# Patient Record
Sex: FEMALE | Race: WHITE | NOT HISPANIC OR LATINO | Employment: FULL TIME | URBAN - METROPOLITAN AREA
[De-identification: names, ages, dates, MRNs, and addresses within clinical notes are randomized per-mention and may not be internally consistent; named-entity substitution may affect disease eponyms.]

---

## 2020-01-27 LAB — CRC RECOMMENDATION EXT: NORMAL

## 2020-11-05 LAB — BCS RECOMMENDATION EXT: NORMAL

## 2021-11-09 LAB — BCS RECOMMENDATION EXT: NORMAL

## 2023-01-09 LAB
CHOLESTEROL, TOTAL: 233
EGFR: 102 ML/MIN/1.73 M2
HBA1C MFR BLD: 5.8 % (ref 4–5.6)
HDLC SERPL-MCNC: 41 MG/DL
LDLC SERPL CALC-MCNC: 141 MG/DL
NONHDLC SERPL-MCNC: 192 MG/DL
TRIGL SERPL-MCNC: 257 MG/DL (ref 0–149)
TSH: 1.11 (ref 0.27–4.2)

## 2023-02-01 LAB — BCS RECOMMENDATION EXT: NORMAL

## 2023-07-20 ENCOUNTER — OFFICE VISIT (OUTPATIENT)
Dept: FAMILY MEDICINE | Facility: CLINIC | Age: 63
End: 2023-07-20
Payer: COMMERCIAL

## 2023-07-20 VITALS
BODY MASS INDEX: 41.14 KG/M2 | HEART RATE: 82 BPM | HEIGHT: 62 IN | DIASTOLIC BLOOD PRESSURE: 72 MMHG | WEIGHT: 223.56 LBS | TEMPERATURE: 98 F | SYSTOLIC BLOOD PRESSURE: 102 MMHG | OXYGEN SATURATION: 98 %

## 2023-07-20 DIAGNOSIS — Z90.49 S/P CHOLECYSTECTOMY: ICD-10-CM

## 2023-07-20 DIAGNOSIS — E78.5 HYPERLIPIDEMIA, UNSPECIFIED HYPERLIPIDEMIA TYPE: ICD-10-CM

## 2023-07-20 DIAGNOSIS — E66.01 CLASS 3 SEVERE OBESITY DUE TO EXCESS CALORIES WITH SERIOUS COMORBIDITY AND BODY MASS INDEX (BMI) OF 40.0 TO 44.9 IN ADULT: ICD-10-CM

## 2023-07-20 DIAGNOSIS — K21.9 GASTROESOPHAGEAL REFLUX DISEASE, UNSPECIFIED WHETHER ESOPHAGITIS PRESENT: ICD-10-CM

## 2023-07-20 DIAGNOSIS — R19.7 DIARRHEA DUE TO MALABSORPTION: ICD-10-CM

## 2023-07-20 DIAGNOSIS — Z00.00 ENCOUNTER FOR MEDICAL EXAMINATION TO ESTABLISH CARE: Primary | ICD-10-CM

## 2023-07-20 DIAGNOSIS — I10 ESSENTIAL HYPERTENSION: ICD-10-CM

## 2023-07-20 DIAGNOSIS — Z00.00 ANNUAL PHYSICAL EXAM: ICD-10-CM

## 2023-07-20 DIAGNOSIS — R73.03 PRE-DIABETES: ICD-10-CM

## 2023-07-20 DIAGNOSIS — K90.9 DIARRHEA DUE TO MALABSORPTION: ICD-10-CM

## 2023-07-20 DIAGNOSIS — Z23 NEED FOR SHINGLES VACCINE: ICD-10-CM

## 2023-07-20 DIAGNOSIS — Z85.42 HISTORY OF ENDOMETRIAL CANCER: ICD-10-CM

## 2023-07-20 PROCEDURE — 1159F MED LIST DOCD IN RCRD: CPT | Mod: CPTII,S$GLB,, | Performed by: FAMILY MEDICINE

## 2023-07-20 PROCEDURE — 3008F PR BODY MASS INDEX (BMI) DOCUMENTED: ICD-10-PCS | Mod: CPTII,S$GLB,, | Performed by: FAMILY MEDICINE

## 2023-07-20 PROCEDURE — 99386 PREV VISIT NEW AGE 40-64: CPT | Mod: 25,S$GLB,, | Performed by: FAMILY MEDICINE

## 2023-07-20 PROCEDURE — 90471 IMMUNIZATION ADMIN: CPT | Mod: S$GLB,,, | Performed by: FAMILY MEDICINE

## 2023-07-20 PROCEDURE — 99204 PR OFFICE/OUTPT VISIT, NEW, LEVL IV, 45-59 MIN: ICD-10-PCS | Mod: 25,S$GLB,, | Performed by: FAMILY MEDICINE

## 2023-07-20 PROCEDURE — 3074F PR MOST RECENT SYSTOLIC BLOOD PRESSURE < 130 MM HG: ICD-10-PCS | Mod: CPTII,S$GLB,, | Performed by: FAMILY MEDICINE

## 2023-07-20 PROCEDURE — 99386 PR PREVENTIVE VISIT,NEW,40-64: ICD-10-PCS | Mod: 25,S$GLB,, | Performed by: FAMILY MEDICINE

## 2023-07-20 PROCEDURE — 3078F PR MOST RECENT DIASTOLIC BLOOD PRESSURE < 80 MM HG: ICD-10-PCS | Mod: CPTII,S$GLB,, | Performed by: FAMILY MEDICINE

## 2023-07-20 PROCEDURE — 90471 ZOSTER RECOMBINANT VACCINE: ICD-10-PCS | Mod: S$GLB,,, | Performed by: FAMILY MEDICINE

## 2023-07-20 PROCEDURE — 4010F ACE/ARB THERAPY RXD/TAKEN: CPT | Mod: CPTII,S$GLB,, | Performed by: FAMILY MEDICINE

## 2023-07-20 PROCEDURE — 4010F PR ACE/ARB THEARPY RXD/TAKEN: ICD-10-PCS | Mod: CPTII,S$GLB,, | Performed by: FAMILY MEDICINE

## 2023-07-20 PROCEDURE — 1160F RVW MEDS BY RX/DR IN RCRD: CPT | Mod: CPTII,S$GLB,, | Performed by: FAMILY MEDICINE

## 2023-07-20 PROCEDURE — 3074F SYST BP LT 130 MM HG: CPT | Mod: CPTII,S$GLB,, | Performed by: FAMILY MEDICINE

## 2023-07-20 PROCEDURE — 3078F DIAST BP <80 MM HG: CPT | Mod: CPTII,S$GLB,, | Performed by: FAMILY MEDICINE

## 2023-07-20 PROCEDURE — 90750 HZV VACC RECOMBINANT IM: CPT | Mod: S$GLB,,, | Performed by: FAMILY MEDICINE

## 2023-07-20 PROCEDURE — 3008F BODY MASS INDEX DOCD: CPT | Mod: CPTII,S$GLB,, | Performed by: FAMILY MEDICINE

## 2023-07-20 PROCEDURE — 1159F PR MEDICATION LIST DOCUMENTED IN MEDICAL RECORD: ICD-10-PCS | Mod: CPTII,S$GLB,, | Performed by: FAMILY MEDICINE

## 2023-07-20 PROCEDURE — 99204 OFFICE O/P NEW MOD 45 MIN: CPT | Mod: 25,S$GLB,, | Performed by: FAMILY MEDICINE

## 2023-07-20 PROCEDURE — 1160F PR REVIEW ALL MEDS BY PRESCRIBER/CLIN PHARMACIST DOCUMENTED: ICD-10-PCS | Mod: CPTII,S$GLB,, | Performed by: FAMILY MEDICINE

## 2023-07-20 PROCEDURE — 99999 PR PBB SHADOW E&M-NEW PATIENT-LVL V: ICD-10-PCS | Mod: PBBFAC,,, | Performed by: FAMILY MEDICINE

## 2023-07-20 PROCEDURE — 90750 ZOSTER RECOMBINANT VACCINE: ICD-10-PCS | Mod: S$GLB,,, | Performed by: FAMILY MEDICINE

## 2023-07-20 PROCEDURE — 99999 PR PBB SHADOW E&M-NEW PATIENT-LVL V: CPT | Mod: PBBFAC,,, | Performed by: FAMILY MEDICINE

## 2023-07-20 RX ORDER — LISINOPRIL AND HYDROCHLOROTHIAZIDE 12.5; 2 MG/1; MG/1
1 TABLET ORAL
COMMUNITY
Start: 2023-07-15 | End: 2023-09-12 | Stop reason: SDUPTHER

## 2023-07-20 RX ORDER — OMEPRAZOLE 20 MG/1
20 CAPSULE, DELAYED RELEASE ORAL
COMMUNITY
Start: 2023-05-07

## 2023-07-20 NOTE — PROGRESS NOTES
Assessment & Plan:    Encounter for medical examination to establish care    Annual physical exam  -     CBC Auto Differential; Future; Expected date: 07/20/2023  -     Comprehensive Metabolic Panel; Future; Expected date: 07/20/2023  -     Hemoglobin A1C; Future; Expected date: 07/20/2023  -     Lipid Panel; Future; Expected date: 07/20/2023  -     HIV 1/2 Ag/Ab (4th Gen); Future; Expected date: 07/20/2023  -     Hepatitis C Antibody; Future; Expected date: 07/20/2023    Fasting labs to be scheduled.    Essential hypertension  -     CBC Auto Differential; Future; Expected date: 07/20/2023  -     Comprehensive Metabolic Panel; Future; Expected date: 07/20/2023  -     Hemoglobin A1C; Future; Expected date: 07/20/2023  -     Lipid Panel; Future; Expected date: 07/20/2023    Controlled. Continue current therapy.     Pre-diabetes  -     Hemoglobin A1C; Future; Expected date: 07/20/2023    Hyperlipidemia, unspecified hyperlipidemia type  -     Lipid Panel; Future; Expected date: 07/20/2023    Class 3 severe obesity due to excess calories with serious comorbidity and body mass index (BMI) of 40.0 to 44.9 in adult  -     Hemoglobin A1C; Future; Expected date: 07/20/2023  -     Lipid Panel; Future; Expected date: 07/20/2023  -     Ambulatory referral/consult to Bariatric Surgery; Future; Expected date: 07/27/2023    Commended patient on getting back on her exercise routine.     History of endometrial cancer  -     Ambulatory referral/consult to Obstetrics / Gynecology; Future; Expected date: 07/27/2023    S/P cholecystectomy  Diarrhea due to malabsorption  Discussed benefit from [employer]. Discussed low fat diet - handout provided.    Gastroesophageal reflux disease, unspecified whether esophagitis present  Controlled. Continue current therapy.     Need for shingles vaccine  -     (In Office Administered) Zoster Recombinant Vaccine      Health maintenance reviewed:  -Patient states that she is UTD on colonoscopy (done 2021 -  repeat recommended in 5 years) and mammogram (normal in December) - will obtain records.    Follow-up: Follow up in about 2 months (around 9/20/2023) for nurse visit for 2nd shingles vaccine, 6 months for chronic follow up.  ______________________________________________________________________    Chief Complaint  Chief Complaint   Patient presents with    Establish Care       HPI  Violetta Coe is a 62 y.o. female with medical diagnoses as listed in the medical history and problem list that presents to the office to establish care. She recently moved to [place] with her partner from [place]. She would like to discuss the following:    Concerned about low potassium from lisinopril-HCTZ. She used to take HCTZ only in the past and had to take a K supplement.  Reports history of endometrial cancer s/p total hysterectomy - requesting referral to gynecology for routine pelvic exams.  Diarrhea - Chronic, began after cholecystectomy. Has gotten worse over COVID. She is prescribed colestipol but hesitant to take medication for it.   Interested in bariatric surgery. She states that she has been overweight all her life. She used to exercise on a regular basis by rowing 6 meters a day but became more inactive when she lost her mother and started working from home during the COVID pandemic. She has started rowing again and is working back up to her personal record. Reports history of pre-DM and HLD.     Health Maintenance         Date Due Completion Date    Hepatitis C Screening Never done ---    Lipid Panel Never done ---    COVID-19 Vaccine (1) Never done ---    HIV Screening Never done ---    TETANUS VACCINE Never done ---    Mammogram Never done ---    Hemoglobin A1c (Diabetic Prevention Screening) Never done ---    Colorectal Cancer Screening Never done ---    Influenza Vaccine (1) 09/01/2023 ---    Shingles Vaccine (2 of 2) 09/14/2023 7/20/2023              PAST MEDICAL HISTORY:  History reviewed. No pertinent past medical  history.    PAST SURGICAL HISTORY:  Past Surgical History:   Procedure Laterality Date    BREAST LUMPECTOMY      CATARACT EXTRACTION      CHOLECYSTECTOMY      GALLBLADDER SURGERY      HYSTERECTOMY         SOCIAL HISTORY:  Social History     Socioeconomic History    Marital status: Unknown   Tobacco Use    Smoking status: Never    Smokeless tobacco: Never   Substance and Sexual Activity    Alcohol use: Never    Drug use: Never       FAMILY HISTORY:  Family History   Problem Relation Age of Onset    Hypertension Mother     Diabetes Mother     Stroke Mother     Breast cancer Mother     Hypertension Father     Hypothyroidism Father     Prostate cancer Father     Hypertension Brother        ALLERGIES AND MEDICATIONS: updated and reviewed.  Review of patient's allergies indicates:  No Known Allergies  Current Outpatient Medications   Medication Sig Dispense Refill    lisinopriL-hydrochlorothiazide (PRINZIDE,ZESTORETIC) 20-12.5 mg per tablet Take 1 tablet by mouth.      omeprazole (PRILOSEC) 20 MG capsule Take 20 mg by mouth.       No current facility-administered medications for this visit.         ROS  Review of Systems   Constitutional:  Positive for activity change and unexpected weight change.   HENT:  Negative for congestion and sore throat.    Eyes:  Negative for photophobia and visual disturbance.   Respiratory:  Negative for cough and shortness of breath.    Cardiovascular:  Negative for chest pain and leg swelling.   Gastrointestinal:  Positive for diarrhea. Negative for abdominal pain, constipation, nausea and vomiting.   Endocrine: Negative for polydipsia, polyphagia and polyuria.   Genitourinary:  Negative for dysuria and urgency.   Musculoskeletal:  Negative for arthralgias and gait problem.   Skin:  Negative for color change.   Neurological:  Negative for dizziness, weakness and headaches.   Psychiatric/Behavioral:  Negative for dysphoric mood and sleep disturbance. The patient is not nervous/anxious.   "        Physical Exam  Vitals:    07/20/23 1715   BP: 102/72   BP Location: Right arm   Patient Position: Sitting   BP Method: Large (Manual)   Pulse: 82   Temp: 98.2 °F (36.8 °C)   TempSrc: Oral   SpO2: 98%   Weight: 101.4 kg (223 lb 8.7 oz)   Height: 5' 2" (1.575 m)    Body mass index is 40.89 kg/m².  Weight: 101.4 kg (223 lb 8.7 oz)   Height: 5' 2" (157.5 cm)   Physical Exam  Constitutional:       General: She is not in acute distress.     Appearance: She is obese.   HENT:      Head: Normocephalic and atraumatic.   Neck:      Thyroid: No thyromegaly.      Vascular: No carotid bruit.   Cardiovascular:      Rate and Rhythm: Normal rate and regular rhythm.      Pulses: Normal pulses.      Heart sounds: Normal heart sounds.   Pulmonary:      Effort: Pulmonary effort is normal. No respiratory distress.      Breath sounds: Normal breath sounds.   Musculoskeletal:      Cervical back: Neck supple.      Right lower leg: No edema.      Left lower leg: No edema.   Lymphadenopathy:      Cervical: No cervical adenopathy.   Skin:     General: Skin is warm and dry.      Findings: No rash.   Neurological:      General: No focal deficit present.      Mental Status: She is alert and oriented to person, place, and time.   Psychiatric:         Mood and Affect: Mood normal.         Behavior: Behavior normal.         Thought Content: Thought content normal.           "

## 2023-07-21 ENCOUNTER — TELEPHONE (OUTPATIENT)
Dept: BARIATRICS | Facility: CLINIC | Age: 63
End: 2023-07-21
Payer: COMMERCIAL

## 2023-07-25 ENCOUNTER — PATIENT OUTREACH (OUTPATIENT)
Dept: ADMINISTRATIVE | Facility: HOSPITAL | Age: 63
End: 2023-07-25
Payer: COMMERCIAL

## 2023-07-25 RX ORDER — PREDNISOLONE ACETATE 10 MG/ML
SUSPENSION/ DROPS OPHTHALMIC
COMMUNITY
Start: 2022-12-20 | End: 2023-11-10

## 2023-07-25 RX ORDER — COVID-19 ANTIGEN TEST
KIT MISCELLANEOUS
COMMUNITY
Start: 2023-03-04

## 2023-07-25 RX ORDER — BROMFENAC SODIUM 0.7 MG/ML
SOLUTION/ DROPS OPHTHALMIC
COMMUNITY
Start: 2022-12-20 | End: 2023-11-10

## 2023-07-25 RX ORDER — OMEPRAZOLE 20 MG/1
20 TABLET, DELAYED RELEASE ORAL
COMMUNITY
End: 2023-11-10

## 2023-07-25 RX ORDER — LISINOPRIL AND HYDROCHLOROTHIAZIDE 12.5; 2 MG/1; MG/1
1 TABLET ORAL DAILY
COMMUNITY
Start: 2023-07-14 | End: 2023-09-12

## 2023-07-25 RX ORDER — MOXIFLOXACIN 5 MG/ML
SOLUTION/ DROPS OPHTHALMIC
COMMUNITY
Start: 2022-12-20 | End: 2023-11-10

## 2023-07-25 RX ORDER — LISINOPRIL 10 MG/1
10 TABLET ORAL 2 TIMES DAILY
COMMUNITY
Start: 2023-02-03 | End: 2023-09-12

## 2023-07-25 RX ORDER — ASPIRIN 325 MG
1 TABLET, DELAYED RELEASE (ENTERIC COATED) ORAL
COMMUNITY

## 2023-07-25 RX ORDER — OMEPRAZOLE 20 MG/1
20 CAPSULE, DELAYED RELEASE ORAL
COMMUNITY
Start: 2023-02-08 | End: 2023-11-10

## 2023-07-25 RX ORDER — NYSTATIN 100000 [USP'U]/G
POWDER TOPICAL
COMMUNITY
Start: 2022-11-18

## 2023-07-25 NOTE — PROGRESS NOTES
Overdue Mammogram - A request was sent today for patient's record. I spoke w/ Tracey ferguson/ [facility], she will have the patient's transcribed results and images on CD sent to our office.    Overdue Colonoscopy - Health Maintenance updated.    Labs updated.

## 2023-07-25 NOTE — LETTER
AUTHORIZATION FOR RELEASE OF   CONFIDENTIAL INFORMATION    Dear [facility],    We are seeing Violetta Coe, date of birth 1960, in the clinic at [facility] FAMILY MED/ INTERNAL MED/ PEDS. Berta Evans DO is the patient's PCP. Violetta Coe has an outstanding lab/procedure at the time we reviewed her chart. In order to help keep her health information updated, she has authorized us to request the following medical record(s):        ( x ) MAMMOGRAM [id number]                   (  )  COLONOSCOPY      (  )  PAP SMEAR                                          (  )  OUTSIDE LAB RESULTS     (  )  DEXA SCAN                                          (  )  EYE EXAM            (  )  FOOT EXAM                                          (  )  ENTIRE RECORD     (  )  OUTSIDE IMMUNIZATIONS                 (  )  _______________         Please fax records to [facility], Lyndsey R Adams, DO,  [phone].     If you have any questions, please contact Vandana Montemayor LPN [facility] at   [phone].     Patient Name: Violetta Coe  : 1960  Patient Phone #: [phone]     ATTN: Tracey  [facility] Address: [address] [place], [place] [address] [address]

## 2023-07-26 DIAGNOSIS — Z12.31 OTHER SCREENING MAMMOGRAM: ICD-10-CM

## 2023-07-27 ENCOUNTER — PATIENT OUTREACH (OUTPATIENT)
Dept: ADMINISTRATIVE | Facility: HOSPITAL | Age: 63
End: 2023-07-27
Payer: COMMERCIAL

## 2023-08-01 ENCOUNTER — TELEPHONE (OUTPATIENT)
Dept: SURGERY | Facility: CLINIC | Age: 63
End: 2023-08-01
Payer: COMMERCIAL

## 2023-08-04 ENCOUNTER — LAB VISIT (OUTPATIENT)
Dept: LAB | Facility: HOSPITAL | Age: 63
End: 2023-08-04
Attending: FAMILY MEDICINE
Payer: COMMERCIAL

## 2023-08-04 DIAGNOSIS — Z00.00 ANNUAL PHYSICAL EXAM: ICD-10-CM

## 2023-08-04 DIAGNOSIS — I10 ESSENTIAL HYPERTENSION: ICD-10-CM

## 2023-08-04 DIAGNOSIS — E78.5 HYPERLIPIDEMIA, UNSPECIFIED HYPERLIPIDEMIA TYPE: ICD-10-CM

## 2023-08-04 DIAGNOSIS — E66.01 CLASS 3 SEVERE OBESITY DUE TO EXCESS CALORIES WITH SERIOUS COMORBIDITY AND BODY MASS INDEX (BMI) OF 40.0 TO 44.9 IN ADULT: ICD-10-CM

## 2023-08-04 DIAGNOSIS — R73.03 PRE-DIABETES: ICD-10-CM

## 2023-08-04 LAB
ALBUMIN SERPL BCP-MCNC: 3.6 G/DL (ref 3.5–5.2)
ALP SERPL-CCNC: 79 U/L (ref 55–135)
ALT SERPL W/O P-5'-P-CCNC: 25 U/L (ref 10–44)
ANION GAP SERPL CALC-SCNC: 10 MMOL/L (ref 8–16)
AST SERPL-CCNC: 19 U/L (ref 10–40)
BASOPHILS # BLD AUTO: 0.03 K/UL (ref 0–0.2)
BASOPHILS NFR BLD: 0.5 % (ref 0–1.9)
BILIRUB SERPL-MCNC: 0.3 MG/DL (ref 0.1–1)
BUN SERPL-MCNC: 15 MG/DL (ref 8–23)
CALCIUM SERPL-MCNC: 9.2 MG/DL (ref 8.7–10.5)
CHLORIDE SERPL-SCNC: 105 MMOL/L (ref 95–110)
CHOLEST SERPL-MCNC: 207 MG/DL (ref 120–199)
CHOLEST/HDLC SERPL: 6.5 {RATIO} (ref 2–5)
CO2 SERPL-SCNC: 25 MMOL/L (ref 23–29)
CREAT SERPL-MCNC: 0.6 MG/DL (ref 0.5–1.4)
DIFFERENTIAL METHOD: ABNORMAL
EOSINOPHIL # BLD AUTO: 0.1 K/UL (ref 0–0.5)
EOSINOPHIL NFR BLD: 1.6 % (ref 0–8)
ERYTHROCYTE [DISTWIDTH] IN BLOOD BY AUTOMATED COUNT: 12.9 % (ref 11.5–14.5)
EST. GFR  (NO RACE VARIABLE): >60 ML/MIN/1.73 M^2
ESTIMATED AVG GLUCOSE: 114 MG/DL (ref 68–131)
GLUCOSE SERPL-MCNC: 111 MG/DL (ref 70–110)
HBA1C MFR BLD: 5.6 % (ref 4–5.6)
HCT VFR BLD AUTO: 42 % (ref 37–48.5)
HCV AB SERPL QL IA: NORMAL
HDLC SERPL-MCNC: 32 MG/DL (ref 40–75)
HDLC SERPL: 15.5 % (ref 20–50)
HGB BLD-MCNC: 13.4 G/DL (ref 12–16)
HIV 1+2 AB+HIV1 P24 AG SERPL QL IA: NORMAL
IMM GRANULOCYTES # BLD AUTO: 0.04 K/UL (ref 0–0.04)
IMM GRANULOCYTES NFR BLD AUTO: 0.6 % (ref 0–0.5)
LDLC SERPL CALC-MCNC: 113.8 MG/DL (ref 63–159)
LYMPHOCYTES # BLD AUTO: 2.3 K/UL (ref 1–4.8)
LYMPHOCYTES NFR BLD: 36.4 % (ref 18–48)
MCH RBC QN AUTO: 27.7 PG (ref 27–31)
MCHC RBC AUTO-ENTMCNC: 31.9 G/DL (ref 32–36)
MCV RBC AUTO: 87 FL (ref 82–98)
MONOCYTES # BLD AUTO: 0.5 K/UL (ref 0.3–1)
MONOCYTES NFR BLD: 8.1 % (ref 4–15)
NEUTROPHILS # BLD AUTO: 3.3 K/UL (ref 1.8–7.7)
NEUTROPHILS NFR BLD: 52.8 % (ref 38–73)
NONHDLC SERPL-MCNC: 175 MG/DL
NRBC BLD-RTO: 0 /100 WBC
PLATELET # BLD AUTO: 293 K/UL (ref 150–450)
PMV BLD AUTO: 10.3 FL (ref 9.2–12.9)
POTASSIUM SERPL-SCNC: 4.2 MMOL/L (ref 3.5–5.1)
PROT SERPL-MCNC: 6.8 G/DL (ref 6–8.4)
RBC # BLD AUTO: 4.84 M/UL (ref 4–5.4)
SODIUM SERPL-SCNC: 140 MMOL/L (ref 136–145)
TRIGL SERPL-MCNC: 306 MG/DL (ref 30–150)
WBC # BLD AUTO: 6.29 K/UL (ref 3.9–12.7)

## 2023-08-04 PROCEDURE — 80061 LIPID PANEL: CPT | Performed by: FAMILY MEDICINE

## 2023-08-04 PROCEDURE — 87389 HIV-1 AG W/HIV-1&-2 AB AG IA: CPT | Performed by: FAMILY MEDICINE

## 2023-08-04 PROCEDURE — 85025 COMPLETE CBC W/AUTO DIFF WBC: CPT | Performed by: FAMILY MEDICINE

## 2023-08-04 PROCEDURE — 86803 HEPATITIS C AB TEST: CPT | Performed by: FAMILY MEDICINE

## 2023-08-04 PROCEDURE — 83036 HEMOGLOBIN GLYCOSYLATED A1C: CPT | Performed by: FAMILY MEDICINE

## 2023-08-04 PROCEDURE — 36415 COLL VENOUS BLD VENIPUNCTURE: CPT | Mod: PO | Performed by: FAMILY MEDICINE

## 2023-08-04 PROCEDURE — 80053 COMPREHEN METABOLIC PANEL: CPT | Performed by: FAMILY MEDICINE

## 2023-08-07 DIAGNOSIS — Z87.898 HISTORY OF PREDIABETES: ICD-10-CM

## 2023-08-07 DIAGNOSIS — I10 ESSENTIAL HYPERTENSION: Primary | ICD-10-CM

## 2023-08-07 DIAGNOSIS — E78.5 HYPERLIPIDEMIA, UNSPECIFIED HYPERLIPIDEMIA TYPE: ICD-10-CM

## 2023-08-07 NOTE — PROGRESS NOTES
Patient was informed of result(s) via [employer].  Please schedule fasting labs before f/u in 6 mo.

## 2023-08-08 ENCOUNTER — TELEPHONE (OUTPATIENT)
Dept: FAMILY MEDICINE | Facility: CLINIC | Age: 63
End: 2023-08-08

## 2023-08-08 NOTE — TELEPHONE ENCOUNTER
----- Message from Berta Evans DO sent at 8/7/2023 10:12 AM CDT -----  Regarding: Labs  Patient was informed of result(s) via Page[employer]ak[employer].  Please schedule fasting labs before f/u in 6 mo.

## 2023-08-09 ENCOUNTER — TELEPHONE (OUTPATIENT)
Dept: BARIATRICS | Facility: CLINIC | Age: 63
End: 2023-08-09
Payer: COMMERCIAL

## 2023-08-09 NOTE — TELEPHONE ENCOUNTER
Phoned patient and she was not able to talk at the present.  She is very tied down with work meetings and she is going out of town.  Asked to be called back next Wednesday at 11 AM. Informed her that we received a referral from her PCP and that is why we were calling her to review benefits and schedule consult appointments.  She just said it is not a good time.  Will defer referral until next Wednesday.

## 2023-09-12 RX ORDER — LISINOPRIL AND HYDROCHLOROTHIAZIDE 12.5; 2 MG/1; MG/1
1 TABLET ORAL DAILY
Qty: 90 TABLET | Refills: 3 | Status: SHIPPED | OUTPATIENT
Start: 2023-09-12

## 2023-09-12 NOTE — TELEPHONE ENCOUNTER
No care due was identified.  Health [facility] Embedded Care Due Messages. Reference number: [id number].   9/12/2023 4:32:52 PM CDT

## 2023-09-18 ENCOUNTER — TELEPHONE (OUTPATIENT)
Dept: BARIATRICS | Facility: CLINIC | Age: 63
End: 2023-09-18
Payer: COMMERCIAL

## 2023-09-18 ENCOUNTER — PATIENT MESSAGE (OUTPATIENT)
Dept: FAMILY MEDICINE | Facility: CLINIC | Age: 63
End: 2023-09-18
Payer: COMMERCIAL

## 2023-09-22 ENCOUNTER — CLINICAL SUPPORT (OUTPATIENT)
Dept: FAMILY MEDICINE | Facility: CLINIC | Age: 63
End: 2023-09-22
Payer: COMMERCIAL

## 2023-09-22 DIAGNOSIS — Z23 NEED FOR SHINGLES VACCINE: Primary | ICD-10-CM

## 2023-09-22 PROCEDURE — 90471 ZOSTER RECOMBINANT VACCINE: ICD-10-PCS | Mod: S$GLB,,, | Performed by: FAMILY MEDICINE

## 2023-09-22 PROCEDURE — 90750 ZOSTER RECOMBINANT VACCINE: ICD-10-PCS | Mod: S$GLB,,, | Performed by: FAMILY MEDICINE

## 2023-09-22 PROCEDURE — 90750 HZV VACC RECOMBINANT IM: CPT | Mod: S$GLB,,, | Performed by: FAMILY MEDICINE

## 2023-09-22 PROCEDURE — 90471 IMMUNIZATION ADMIN: CPT | Mod: S$GLB,,, | Performed by: FAMILY MEDICINE

## 2023-09-22 PROCEDURE — 99999 PR PBB SHADOW E&M-EST. PATIENT-LVL I: CPT | Mod: PBBFAC,,,

## 2023-09-22 PROCEDURE — 99999 PR PBB SHADOW E&M-EST. PATIENT-LVL I: ICD-10-PCS | Mod: PBBFAC,,,

## 2023-09-24 ENCOUNTER — PATIENT MESSAGE (OUTPATIENT)
Dept: FAMILY MEDICINE | Facility: CLINIC | Age: 63
End: 2023-09-24
Payer: COMMERCIAL

## 2023-10-06 ENCOUNTER — TELEPHONE (OUTPATIENT)
Dept: BARIATRICS | Facility: CLINIC | Age: 63
End: 2023-10-06
Payer: COMMERCIAL

## 2023-10-08 ENCOUNTER — PATIENT MESSAGE (OUTPATIENT)
Dept: FAMILY MEDICINE | Facility: CLINIC | Age: 63
End: 2023-10-08
Payer: COMMERCIAL

## 2023-10-09 NOTE — TELEPHONE ENCOUNTER
Pt contacted and instructed that her pt portal is to be used for her personal health care and should not be used for other patients. Pt informed that messages concerning her father need to be sent through his pt portal or by calling pt access. Pt verbalized understanding.

## 2023-11-10 ENCOUNTER — TELEPHONE (OUTPATIENT)
Dept: BARIATRICS | Facility: CLINIC | Age: 63
End: 2023-11-10
Payer: COMMERCIAL

## 2023-11-10 ENCOUNTER — OFFICE VISIT (OUTPATIENT)
Dept: BARIATRICS | Facility: CLINIC | Age: 63
End: 2023-11-10
Payer: COMMERCIAL

## 2023-11-10 ENCOUNTER — CLINICAL SUPPORT (OUTPATIENT)
Dept: BARIATRICS | Facility: CLINIC | Age: 63
End: 2023-11-10
Payer: COMMERCIAL

## 2023-11-10 VITALS
SYSTOLIC BLOOD PRESSURE: 110 MMHG | BODY MASS INDEX: 40.89 KG/M2 | HEART RATE: 90 BPM | WEIGHT: 223.56 LBS | OXYGEN SATURATION: 99 % | DIASTOLIC BLOOD PRESSURE: 78 MMHG

## 2023-11-10 DIAGNOSIS — Z71.3 DIETARY COUNSELING AND SURVEILLANCE: ICD-10-CM

## 2023-11-10 DIAGNOSIS — K21.9 GASTROESOPHAGEAL REFLUX DISEASE, UNSPECIFIED WHETHER ESOPHAGITIS PRESENT: ICD-10-CM

## 2023-11-10 DIAGNOSIS — G47.33 OSA ON CPAP: Primary | ICD-10-CM

## 2023-11-10 DIAGNOSIS — I10 HYPERTENSION, UNSPECIFIED TYPE: Primary | ICD-10-CM

## 2023-11-10 DIAGNOSIS — G47.33 OSA ON CPAP: ICD-10-CM

## 2023-11-10 DIAGNOSIS — E66.01 CLASS 3 SEVERE OBESITY DUE TO EXCESS CALORIES WITH SERIOUS COMORBIDITY AND BODY MASS INDEX (BMI) OF 40.0 TO 44.9 IN ADULT: ICD-10-CM

## 2023-11-10 DIAGNOSIS — I10 HYPERTENSION, UNSPECIFIED TYPE: ICD-10-CM

## 2023-11-10 DIAGNOSIS — E66.01 MORBID OBESITY WITH BMI OF 40.0-44.9, ADULT: ICD-10-CM

## 2023-11-10 DIAGNOSIS — E78.5 HYPERLIPIDEMIA, UNSPECIFIED HYPERLIPIDEMIA TYPE: ICD-10-CM

## 2023-11-10 PROCEDURE — 3008F PR BODY MASS INDEX (BMI) DOCUMENTED: ICD-10-PCS | Mod: CPTII,S$GLB,, | Performed by: NURSE PRACTITIONER

## 2023-11-10 PROCEDURE — 99999 PR PBB SHADOW E&M-EST. PATIENT-LVL V: ICD-10-PCS | Mod: PBBFAC,,, | Performed by: NURSE PRACTITIONER

## 2023-11-10 PROCEDURE — 1160F RVW MEDS BY RX/DR IN RCRD: CPT | Mod: CPTII,S$GLB,, | Performed by: NURSE PRACTITIONER

## 2023-11-10 PROCEDURE — 1159F MED LIST DOCD IN RCRD: CPT | Mod: CPTII,S$GLB,, | Performed by: NURSE PRACTITIONER

## 2023-11-10 PROCEDURE — 3044F PR MOST RECENT HEMOGLOBIN A1C LEVEL <7.0%: ICD-10-PCS | Mod: CPTII,S$GLB,, | Performed by: NURSE PRACTITIONER

## 2023-11-10 PROCEDURE — 3078F PR MOST RECENT DIASTOLIC BLOOD PRESSURE < 80 MM HG: ICD-10-PCS | Mod: CPTII,S$GLB,, | Performed by: NURSE PRACTITIONER

## 2023-11-10 PROCEDURE — 3074F SYST BP LT 130 MM HG: CPT | Mod: CPTII,S$GLB,, | Performed by: NURSE PRACTITIONER

## 2023-11-10 PROCEDURE — 99999 PR PBB SHADOW E&M-EST. PATIENT-LVL I: ICD-10-PCS | Mod: PBBFAC,,, | Performed by: DIETITIAN, REGISTERED

## 2023-11-10 PROCEDURE — 3044F HG A1C LEVEL LT 7.0%: CPT | Mod: CPTII,S$GLB,, | Performed by: NURSE PRACTITIONER

## 2023-11-10 PROCEDURE — 1159F PR MEDICATION LIST DOCUMENTED IN MEDICAL RECORD: ICD-10-PCS | Mod: CPTII,S$GLB,, | Performed by: NURSE PRACTITIONER

## 2023-11-10 PROCEDURE — 4010F ACE/ARB THERAPY RXD/TAKEN: CPT | Mod: CPTII,S$GLB,, | Performed by: NURSE PRACTITIONER

## 2023-11-10 PROCEDURE — 99999 PR PBB SHADOW E&M-EST. PATIENT-LVL I: CPT | Mod: PBBFAC,,, | Performed by: DIETITIAN, REGISTERED

## 2023-11-10 PROCEDURE — 1160F PR REVIEW ALL MEDS BY PRESCRIBER/CLIN PHARMACIST DOCUMENTED: ICD-10-PCS | Mod: CPTII,S$GLB,, | Performed by: NURSE PRACTITIONER

## 2023-11-10 PROCEDURE — 3074F PR MOST RECENT SYSTOLIC BLOOD PRESSURE < 130 MM HG: ICD-10-PCS | Mod: CPTII,S$GLB,, | Performed by: NURSE PRACTITIONER

## 2023-11-10 PROCEDURE — 99999 PR PBB SHADOW E&M-EST. PATIENT-LVL V: CPT | Mod: PBBFAC,,, | Performed by: NURSE PRACTITIONER

## 2023-11-10 PROCEDURE — 99499 UNLISTED E&M SERVICE: CPT | Mod: S$GLB,,, | Performed by: DIETITIAN, REGISTERED

## 2023-11-10 PROCEDURE — 3078F DIAST BP <80 MM HG: CPT | Mod: CPTII,S$GLB,, | Performed by: NURSE PRACTITIONER

## 2023-11-10 PROCEDURE — 99499 NO LOS: ICD-10-PCS | Mod: S$GLB,,, | Performed by: DIETITIAN, REGISTERED

## 2023-11-10 PROCEDURE — 3008F BODY MASS INDEX DOCD: CPT | Mod: CPTII,S$GLB,, | Performed by: NURSE PRACTITIONER

## 2023-11-10 PROCEDURE — 99205 PR OFFICE/OUTPT VISIT, NEW, LEVL V, 60-74 MIN: ICD-10-PCS | Mod: S$GLB,,, | Performed by: NURSE PRACTITIONER

## 2023-11-10 PROCEDURE — 99205 OFFICE O/P NEW HI 60 MIN: CPT | Mod: S$GLB,,, | Performed by: NURSE PRACTITIONER

## 2023-11-10 PROCEDURE — 4010F PR ACE/ARB THEARPY RXD/TAKEN: ICD-10-PCS | Mod: CPTII,S$GLB,, | Performed by: NURSE PRACTITIONER

## 2023-11-10 NOTE — PROGRESS NOTES
BARIATRIC NEW PATIENT EVALUATION    CHIEF COMPLAINT:   Morbid obesity, body mass index is 40.89 kg/m². and inability to lose weight.    HPI:  Violetta Coe is a 62 y.o. morbidly obese female. Her current body mass index is 40.89 kg/m². She has multiple associated comorbidities including essential hypertension, hyperlipidemia, GERMANIA on CPAP and GERD on daily medications.  She has struggled with excess weight since childhood.  Her highest adult weight was 245 lbs at age 41 , and her lowest adult weight was 185 lbs at age 46.  The patient has tried Weight Watchers, calorie counting, low-carb diet, Melida rula and exercise .  The patient was most successful with low carb and smaller portions with a weight loss of 60 lbs.  Her current exercise includes  rowing   5 times a week. She denies any history of eating disorder such as anorexia, bulimia, or taking laxatives for weight loss, and denies any addiction including illicit substances, alcohol, or gambling.  Patient states she has a good  support system.  She lives with family.  She is currently employed  .  She  endorses a 30 year history of GERD, needs incline pillow to sleep at night,eating late and trigger foods make reflux worse. The patient's goal doesn't have a lbs in mind.       ESS: Score of 7, reviewed 11/10/2023.  Does not need Sleep Study.    Pre op weight-223  IBW-132    PAST MEDICAL HISTORY:  Past Medical History:   Diagnosis Date    GERD (gastroesophageal reflux disease)     Hx of colonic polyp 01/27/2020    Hyperlipidemia     Hypertension     Obesity, unspecified        PAST SURGICAL HISTORY:  Past Surgical History:   Procedure Laterality Date    BREAST LUMPECTOMY      CATARACT EXTRACTION      CHOLECYSTECTOMY      COLONOSCOPY W/ POLYPECTOMY  01/27/2020    GALLBLADDER SURGERY      HYSTERECTOMY  12/01/2013    Endometrial Cancer       FAMILY HISTORY:  Family History   Problem Relation Age of Onset    Hypertension Mother     Diabetes Mother      Stroke Mother     Breast cancer Mother     Hypertension Father     Hypothyroidism Father     Prostate cancer Father     Hypertension Brother         SOCIAL HISTORY:  Social History     Socioeconomic History    Marital status: Unknown   Tobacco Use    Smoking status: Never    Smokeless tobacco: Never   Substance and Sexual Activity    Alcohol use: Never    Drug use: Never       MEDICATIONS:    Current Outpatient Medications:     CALCIUM ORAL, Take by mouth., Disp: , Rfl:     cholecalciferol, vitamin D3, 1,250 mcg (50,000 unit) capsule, Take 1 tablet by mouth., Disp: , Rfl:     FLOWFLEX COVID-19 AG HOME TEST Kit, , Disp: , Rfl:     lisinopriL-hydrochlorothiazide (PRINZIDE,ZESTORETIC) 20-12.5 mg per tablet, Take 1 tablet by mouth once daily., Disp: 90 tablet, Rfl: 3    MAGNESIUM OXIDE ORAL, Take 1 tablet by mouth once daily., Disp: , Rfl:     multivitamin with minerals tablet, Take 1 tablet by mouth once daily., Disp: , Rfl:     nystatin (MYCOSTATIN) powder, Apply topically., Disp: , Rfl:     omeprazole (PRILOSEC) 20 MG capsule, Take 20 mg by mouth., Disp: , Rfl:     varicella-zoster gE-AS01B, PF, (SHINGRIX) 50 mcg/0.5 mL injection, Inject 0.5 mLs into the muscle., Disp: , Rfl:     VITAMIN B COMPLEX ORAL, Take 1 tablet by mouth once daily., Disp: , Rfl:     ALLERGIES:  Review of patient's allergies indicates:  No Known Allergies    Review of Systems   Constitutional:  Negative for chills and fever.   HENT:  Negative for ear pain, nosebleeds and sore throat.    Eyes:  Negative for blurred vision and double vision.   Respiratory:  Negative for cough and shortness of breath.    Cardiovascular:  Negative for chest pain, palpitations, orthopnea, claudication and leg swelling.   Gastrointestinal:  Negative for abdominal pain, constipation, diarrhea, heartburn, nausea and vomiting.   Genitourinary:  Negative for dysuria and urgency.   Musculoskeletal:  Negative for back pain and joint pain.   Skin:  Negative for rash.    Neurological:  Negative for dizziness, tingling, focal weakness and headaches.   Endo/Heme/Allergies:  Does not bruise/bleed easily.   Psychiatric/Behavioral:  Negative for depression and suicidal ideas.        Vitals:    11/10/23 1248   BP: 110/78   Pulse: 90   SpO2: 99%   Weight: 101.4 kg (223 lb 8.7 oz)   PainSc: 0-No pain       Physical Exam  Vitals and nursing note reviewed.   Constitutional:       Appearance: She is well-developed. She is morbidly obese.   HENT:      Head: Normocephalic.      Nose: Nose normal.      Mouth/Throat:      Mouth: Mucous membranes are moist.   Eyes:      Extraocular Movements: Extraocular movements intact.   Cardiovascular:      Rate and Rhythm: Normal rate and regular rhythm.      Heart sounds: Normal heart sounds.   Pulmonary:      Effort: Pulmonary effort is normal.      Breath sounds: Normal breath sounds.   Abdominal:      General: Bowel sounds are normal.      Palpations: Abdomen is soft.   Musculoskeletal:         General: Normal range of motion.      Cervical back: Normal range of motion.   Skin:     General: Skin is warm and dry.      Capillary Refill: Capillary refill takes less than 2 seconds.   Neurological:      Mental Status: She is alert and oriented to person, place, and time.   Psychiatric:         Mood and Affect: Mood normal.          DIAGNOSIS:  1. Morbid obesity, body mass index is 40.89 kg/m². and inability to lose weight.  2. Co-morbidities: essential hypertension, hyperlipidemia, and GERD on daily medications    PLAN:  The patient is a good candidate for Bariatric Surgery. The patient is interested in laparoscopic sagrario-en-y gastric bypass with Dr. Sweet. The surgery and post-op care was discussed in detail with the patient. All questions were answered.    The patient understands that bariatric surgery is a tool to aid in weight loss and that they need to be committed to the diet and exercise post-operatively for successful weight loss. Discussed with  patient that bariatric surgery is not the easy way out and that it will take plenty of dedication on the patient's part to be successful. Also discussed the possibility of weight regain if the patient strays from the diet guidelines or exercise requirements. Patient verbalized understanding and wishes to proceed with the work-up.    Estimated Goal weight is 175-180 lbs.    Discussed no NSAIDS post op    ORDERS:  1. Stress Test, Chest X-Ray,EKG and EGD with [facility]   2. Psychological Consult, Bariatric Dietician Consult  3. Bariatric Labs: Per orders.  4. Bariatric Medicine referral     PCP: Berta Evans DO  RTC: As scheduled.    This includes 60 mins face to face time and non-face to face time preparing to see the patient (eg, review of tests), obtaining and/or reviewing separately obtained history, documenting clinical information in the electronic or other health record, independently interpreting results and communicating results to the patient/family/caregiver, or care coordinator.

## 2023-11-10 NOTE — PROGRESS NOTES
"NUTRITIONAL CONSULT    Referring Physician: Undecided   Reason for MNT Referral: Initial assessment for laparoscopic Wilfred-en-Y work-up    PAST MEDICAL HISTORY:   62 y.o. female  .  Weight history includes Her highest adult weight was 245 lbs at age 46. Lowest adult weight was 180 at age 55.  Dieting attempts include: Weight Watchers, Juliet Len, calorie restriction, low fat, low carb diet, smaller portions, and exercise. The patient was most successful with low carb and smaller portion with a weight loss of 60 lbs.    Past Medical History:   Diagnosis Date    GERD (gastroesophageal reflux disease)     Hx of colonic polyp 2020    Hyperlipidemia     Hypertension     Obesity, unspecified        CLINICAL DATA:  62 y.o.-year-old White female.  Height: 5' 2"  Weight: 223 lbs  IBW: 132 lbs  BMI: 40.89  The patient's goal weight (50% EBW): 178 lbs  Personal goal weight: 175-180 lbs    Goal for Bariatric Surgery: to improve health, to improve quality of life, to lose weight, to prevent future medical conditions (DMII runs in family)    DAILY NUTRITIONAL NEEDS: pre-op nutritional bariatric guidelines to promote weight loss  5909-6306 Calories    Grams Protein    NUTRITION & HEALTH HISTORY:  Greatest challenge: starchy CHO and high-fat diet    Current diet recall:     6am up rowing  B: Bullet proof coffee (coffee with butter, coconut oil, and collagen protein)  L: [facility] cafeteria - fried chicken wings, lasagna, or baked chicken, creamed spinach, roasted potatoes or Mexican - spinach enchiladas with cheese  D: Pork chops, spaghetti, ground beef burritos with tomato, avocado, soured cream  S: Cookies, pie    Current Diet:  Meal pattern: 2 meals  Protein supplements: 0  Snackin / day  Vegetables: Likes a few. Eats 2-3 times per week. Spinach, avocados, broccoli, green beans, eggplant   Fruits: Likes a few. Eats 2-3 times per week. Grapefruit, berries, bananas, oranges  Beverages:  Unsweetened iced " tea  Dining out:  2-3 x week for lunch, 2-3 x week for dinner  Mostly restaurants and cafeteria . Chinese - noodles and vegetables, Leonel - green sidhu with rice, Sushi - rolls with cream cheese and avocado, shrimp, tuna or sashimi  Cooking at home:  2-3 x week  Mostly baked, smothered, pan fried, and smothered meat and starchy CHO. Pork chops, shrimp creole, spaghetti, salad with kale, parm cheese, dried cranberries, mashed potatoes    Food Allergies:   None reported    Vitamins / Minerals / Herbs:   None    Labs:   No recent    Exercise:  Past exercise: Adequate  Rowing 6 x week    Current exercise: Adequate  Rowing 5-6 x week 20 mins    Restrictions to exercise: None    Social:  Works regular daytime shifts. From home 2-3 x week 7:30am/8am-5:30pm/6pm or 8:30am-6pm in office  Lives with wife and father.  Grocery shopping and food prep wife.  Patient believes the household will be supportive after surgery.  Alcohol: None.  Smoking: None.    ASSESSMENT:  Patient reports attempts at weight loss, only to regain lost weight.  Patient demonstrated knowledge of healthy eating behaviors and exercise patterns; admits to not eating healthy and not exercising at this point.  Patient states willingness to change lifestyle and make behavior modifications     Barriers to Education: none    Stage of change: determination    NUTRITION DIAGNOSIS:    Morbid Obesity related to Excessive carbohydrate intake, Excessive calorie intake, and Inadequate protein intake as evidence by BMI.    BARIATRIC DIET DISCUSSION/PLAN:  Discussed diet after surgery and related to patient's food record.  Reviewed nutrition guidelines for before and after surgery.  Answered all questions.  Work on gradually cutting back on starchy CHO in the diet.  Begin trying various protein supplements to determine preference.  1200-calorie diet.  1500-calorie diet.  Start including protein supplements in the diet plan daily.    PT Plan  Try protein drinks  Cut down on  coffee    RECOMMENDATIONS:  Pt is a potential candidate for bariatric surgery.    Follow up in one month.  Needs additional visits with RD.    Patient verbalized understanding.      Communicated nutrition plan with bariatric team.    SESSION TIME:  75 minutes

## 2023-11-10 NOTE — PATIENT INSTRUCTIONS
Prior to surgery you will need to complete:  - Dietitian consult and follow up appointments as needed  - Labs  - Chest X-ray  - EKG  - Psychological evaluation, Please call psychiatry [phone] to schedule  - Stress test   - EGD    In preparation for bariatric surgery, please complete the following:   Discuss your current medications with your primary care provider, remember your medications will need to be crushed, chewable, or in liquid form for the first 2-4 weeks after a gastric bypass or sleeve.  For a gastric band, your medications will need to be crushed indefinitely.    If you take a blood thinner such as: Coumadin (warfarin), Pradaxa (dabigatran), or Plavix (clopidogrel), you will need to speak with your prescribing provider on how or if this medication can be stopped before surgery.   If you take a medication for depression or anxiety, remember to discuss this with the psychologist or psychiatrist that you see.   If you take medication for arthritis on a daily basis that is considered a non-steroidal anti-inflammatory (NSAID), please discuss with your prescribing physician an alternative medication.  After having gastric bypass or gastric sleeve, this group of medications is not appropriate to take due to increased risk of bleeding stomach ulcers.      DEFINITIONS  Appointments: Pre-scheduled meetings or consultations with any physician, advanced practice provider, dietitian, or psychologist, and labs, imaging studies, sleep studies, etc.   Late cancellation: Cancelling an appointment 24-48 hours prior to scheduled time.  No-Show appointment:  is when   You do NOT arrive to your appointment at the time its scheduled.  You call to cancel or cancel via [facility] less than 24 hours in advance of your scheduled appointment  You show up 15 minutes AFTER your scheduled appointment time without any notification of being late.     POLICY  You are allowed up to 3 cancellations for appointments.   After 3  cancellations your case will be placed on hold for 2 months and after that time you can resume the program.   You are allowed only 1 no-show for an appointment.   You will be re-scheduled one time and if there is a 2nd no-show at any point, your case will be placed on hold for 3 months.  After 3 months you can resume the program.     Upon resuming the program after being placed on hold for either above mentioned reasons, if you have a late cancel or no show for any appointment, the bariatric team will review if youre an appropriate candidate for surgery at the monthly meeting.

## 2023-11-14 ENCOUNTER — PATIENT MESSAGE (OUTPATIENT)
Dept: BARIATRICS | Facility: CLINIC | Age: 63
End: 2023-11-14
Payer: COMMERCIAL

## 2023-11-14 ENCOUNTER — TELEPHONE (OUTPATIENT)
Dept: SURGERY | Facility: CLINIC | Age: 63
End: 2023-11-14
Payer: COMMERCIAL

## 2023-12-12 ENCOUNTER — PATIENT MESSAGE (OUTPATIENT)
Dept: BARIATRICS | Facility: CLINIC | Age: 63
End: 2023-12-12
Payer: COMMERCIAL

## 2023-12-13 ENCOUNTER — PATIENT MESSAGE (OUTPATIENT)
Dept: BARIATRICS | Facility: CLINIC | Age: 63
End: 2023-12-13
Payer: COMMERCIAL

## 2023-12-15 ENCOUNTER — CLINICAL SUPPORT (OUTPATIENT)
Dept: BARIATRICS | Facility: CLINIC | Age: 63
End: 2023-12-15
Payer: COMMERCIAL

## 2023-12-15 DIAGNOSIS — K21.9 GASTROESOPHAGEAL REFLUX DISEASE, UNSPECIFIED WHETHER ESOPHAGITIS PRESENT: ICD-10-CM

## 2023-12-15 DIAGNOSIS — G47.33 OSA ON CPAP: ICD-10-CM

## 2023-12-15 DIAGNOSIS — Z71.3 DIETARY COUNSELING AND SURVEILLANCE: ICD-10-CM

## 2023-12-15 DIAGNOSIS — I10 HYPERTENSION, UNSPECIFIED TYPE: Primary | ICD-10-CM

## 2023-12-15 DIAGNOSIS — E66.01 MORBID OBESITY WITH BMI OF 40.0-44.9, ADULT: ICD-10-CM

## 2023-12-15 PROCEDURE — 99499 NO LOS: ICD-10-PCS | Mod: 95,,, | Performed by: DIETITIAN, REGISTERED

## 2023-12-15 PROCEDURE — 97803 PR MED NUTR THER, SUBSQ, INDIV, EA 15 MIN: ICD-10-PCS | Mod: 95,,, | Performed by: DIETITIAN, REGISTERED

## 2023-12-15 PROCEDURE — 97803 MED NUTRITION INDIV SUBSEQ: CPT | Mod: 95,,, | Performed by: DIETITIAN, REGISTERED

## 2023-12-15 PROCEDURE — 99499 UNLISTED E&M SERVICE: CPT | Mod: 95,,, | Performed by: DIETITIAN, REGISTERED

## 2023-12-15 NOTE — PROGRESS NOTES
The patient location is: home ([place])  The chief complaint leading to consultation is: 3 months Medically Supervised Diet pending Gastric Bypass  Visit type: audiovisual     Face to Face time with patient: 40 min    45 minutes of total time spent on the encounter, which includes face to face time and non-face to face time preparing to see the patient (eg, review of tests), Obtaining and/or reviewing separately obtained history, Documenting clinical information in the electronic or other health record, Independently interpreting results (not separately reported) and communicating results to the patient/family/caregiver, or Care coordination (not separately reported).       Each patient to whom he or she provides medical services by telemedicine is:  (1) informed of the relationship between the physician and patient and the respective role of any other health care provider with respect to management of the patient; and (2) notified that he or she may decline to receive medical services by telemedicine and may withdraw from such care at any time.    NUTRITION NOTE    Referring Physician: Undecided   Reason for MNT Referral: 3 months Medically Supervised Diet pending Gastric Bypass    Patient presents for follow-up visit for MSD with weight  not available  over the past month    CLINICAL DATA:  62 y.o. female.    Initial weight: 223 lbs  Current Weight: NA lbs  Weight Change Since Initial Visit: NA lbs  Ideal Body Weight: 132 lbs  BMI: NA    DAILY NUTRITIONAL NEEDS: pre-op nutritional bariatric guidelines to promote weight loss  6202-3657 Calories    Grams Protein    CURRENT DIET:  Regular diet.  Diet Recall: Food records are present.  Greatest challenge: starchy CHO and high-fat diet  Progress: cut down on butter in coffee     Current diet recall:     B: Bullet proof coffee (coffee with 1 tsp butter, coconut oil, and Bulletproof collagen protein)  L: Spaghetti 1-1.5 cup pasta with 1 cup meat sauce   D: Maribell and  gyoza, broccoli tempura, banana tempura     Current Diet:     Diet Includes: 2 meals  Meal Pattern: Same.  Protein Supplements: 0 per day.  Snacking: None  Vegetables: Likes a few. Eats 1 x week. Spinach, avocados, broccoli, green beans, eggplant   Fruits: Likes a few. Eats 1 x  week. Grapefruit, berries, bananas, oranges  Beverages: Unsweetened iced tea  Dining out: 2-3 x week for lunch, 2-3 x week for dinner. Mostly restaurants and cafeteria. Chinese - noodles and vegetables, Malian - green sidhu with rice, Sushi - rolls with cream cheese and avocado, shrimp, tuna or sashimi  Cooking at home: 2-3 x week  Mostly baked, smothered, pan fried, and smothered meat and starchy CHO. Pork chops, shrimp creole, spaghetti, salad with kale, parm cheese, dried cranberries, mashed potatoes    Food Allergies:   None reported     Vitamins / Minerals / Herbs:   None     Labs:   No recent     Exercise:  Current exercise: Adequate  Rowing 5-6 x week 20 mins     Restrictions to exercise: None     Social:  Works regular daytime shifts. From home 2-3 x week 7:30am/8am-5:30pm/6pm or 8:30am-6pm in office  Lives with wife and father.  Grocery shopping and food prep wife.  Patient believes the household will be supportive after surgery.  Alcohol: None.  Smoking: None.    ASSESSMENT:  Patient demonstrates no willingness to change lifestyle habits as evidenced by  no dietary changes .    Doing poorly with working on greatest challenges (starchy CHO and high-fat diet).    Barriers to Education:  none  Stage of Change:  contemplation    NUTRITION DIAGNOSIS:  Morbid Obesity related to Excessive carbohydrate intake, Excessive calorie intake, and Inadequate protein intake as evidence by BMI.  Obesity Status: Same    BARIATRIC DIET DISCUSSION/PLAN:  Discussed diet after surgery and related to patient's food record.  Reviewed nutrition guidelines for before and after surgery.  Answered all questions.  Reiterated protein goals  Discussed portion  sizing and bariatric plate method  Discussed plant based protein diet  Work on gradually cutting back on starchy CHO in the diet.  Begin trying various protein supplements to determine preference.  Start including protein supplements in the diet plan daily.    PT Plan  Will try protein drinks  Try to eliminate high fat/high sugar sweet  Eat more fruit - 1 per day    RECOMMENDATIONS:  Pt is potential candidate for surgery.    Diet: Adjust diet plan.  Work on gradually cutting back on starchy CHO in the diet.  Begin trying various protein supplements to determine preference.  Increase protein intake    Exercise: Maintain.    Behavior Modification: Begin to document food & activity logs daily.    Return to clinic in one month.    Needs additional visits with RD.    Communicated nutrition plan with bariatric team.    SESSION TIME:  40  minutes

## 2023-12-18 ENCOUNTER — PATIENT MESSAGE (OUTPATIENT)
Dept: BARIATRICS | Facility: CLINIC | Age: 63
End: 2023-12-18
Payer: COMMERCIAL

## 2024-01-09 ENCOUNTER — PATIENT MESSAGE (OUTPATIENT)
Dept: BARIATRICS | Facility: CLINIC | Age: 64
End: 2024-01-09
Payer: COMMERCIAL

## 2024-01-19 ENCOUNTER — LAB VISIT (OUTPATIENT)
Dept: LAB | Facility: HOSPITAL | Age: 64
End: 2024-01-19
Attending: FAMILY MEDICINE
Payer: COMMERCIAL

## 2024-01-19 ENCOUNTER — CLINICAL SUPPORT (OUTPATIENT)
Dept: BARIATRICS | Facility: CLINIC | Age: 64
End: 2024-01-19
Payer: COMMERCIAL

## 2024-01-19 DIAGNOSIS — G47.33 OSA ON CPAP: ICD-10-CM

## 2024-01-19 DIAGNOSIS — E66.01 MORBID OBESITY WITH BMI OF 40.0-44.9, ADULT: ICD-10-CM

## 2024-01-19 DIAGNOSIS — Z87.898 HISTORY OF PREDIABETES: ICD-10-CM

## 2024-01-19 DIAGNOSIS — I10 HYPERTENSION, UNSPECIFIED TYPE: Primary | ICD-10-CM

## 2024-01-19 DIAGNOSIS — E78.5 HYPERLIPIDEMIA, UNSPECIFIED HYPERLIPIDEMIA TYPE: ICD-10-CM

## 2024-01-19 DIAGNOSIS — I10 ESSENTIAL HYPERTENSION: ICD-10-CM

## 2024-01-19 DIAGNOSIS — Z71.3 DIETARY COUNSELING AND SURVEILLANCE: ICD-10-CM

## 2024-01-19 DIAGNOSIS — K21.9 GASTROESOPHAGEAL REFLUX DISEASE, UNSPECIFIED WHETHER ESOPHAGITIS PRESENT: ICD-10-CM

## 2024-01-19 LAB
ALBUMIN SERPL BCP-MCNC: 3.7 G/DL (ref 3.5–5.2)
ALP SERPL-CCNC: 78 U/L (ref 55–135)
ALT SERPL W/O P-5'-P-CCNC: 21 U/L (ref 10–44)
ANION GAP SERPL CALC-SCNC: 10 MMOL/L (ref 8–16)
AST SERPL-CCNC: 22 U/L (ref 10–40)
BASOPHILS # BLD AUTO: 0.04 K/UL (ref 0–0.2)
BASOPHILS NFR BLD: 0.7 % (ref 0–1.9)
BILIRUB SERPL-MCNC: 0.6 MG/DL (ref 0.1–1)
BUN SERPL-MCNC: 18 MG/DL (ref 8–23)
CALCIUM SERPL-MCNC: 9.5 MG/DL (ref 8.7–10.5)
CHLORIDE SERPL-SCNC: 104 MMOL/L (ref 95–110)
CHOLEST SERPL-MCNC: 214 MG/DL (ref 120–199)
CHOLEST/HDLC SERPL: 6.1 {RATIO} (ref 2–5)
CO2 SERPL-SCNC: 25 MMOL/L (ref 23–29)
CREAT SERPL-MCNC: 0.7 MG/DL (ref 0.5–1.4)
DIFFERENTIAL METHOD BLD: NORMAL
EOSINOPHIL # BLD AUTO: 0.1 K/UL (ref 0–0.5)
EOSINOPHIL NFR BLD: 1.8 % (ref 0–8)
ERYTHROCYTE [DISTWIDTH] IN BLOOD BY AUTOMATED COUNT: 13.2 % (ref 11.5–14.5)
EST. GFR  (NO RACE VARIABLE): >60 ML/MIN/1.73 M^2
ESTIMATED AVG GLUCOSE: 111 MG/DL (ref 68–131)
GLUCOSE SERPL-MCNC: 112 MG/DL (ref 70–110)
HBA1C MFR BLD: 5.5 % (ref 4–5.6)
HCT VFR BLD AUTO: 43.4 % (ref 37–48.5)
HDLC SERPL-MCNC: 35 MG/DL (ref 40–75)
HDLC SERPL: 16.4 % (ref 20–50)
HGB BLD-MCNC: 13.9 G/DL (ref 12–16)
IMM GRANULOCYTES # BLD AUTO: 0.02 K/UL (ref 0–0.04)
IMM GRANULOCYTES NFR BLD AUTO: 0.3 % (ref 0–0.5)
LDLC SERPL CALC-MCNC: 118.8 MG/DL (ref 63–159)
LYMPHOCYTES # BLD AUTO: 2.3 K/UL (ref 1–4.8)
LYMPHOCYTES NFR BLD: 38.6 % (ref 18–48)
MCH RBC QN AUTO: 27.8 PG (ref 27–31)
MCHC RBC AUTO-ENTMCNC: 32 G/DL (ref 32–36)
MCV RBC AUTO: 87 FL (ref 82–98)
MONOCYTES # BLD AUTO: 0.5 K/UL (ref 0.3–1)
MONOCYTES NFR BLD: 7.6 % (ref 4–15)
NEUTROPHILS # BLD AUTO: 3.1 K/UL (ref 1.8–7.7)
NEUTROPHILS NFR BLD: 51 % (ref 38–73)
NONHDLC SERPL-MCNC: 179 MG/DL
NRBC BLD-RTO: 0 /100 WBC
PLATELET # BLD AUTO: 248 K/UL (ref 150–450)
PMV BLD AUTO: 10.6 FL (ref 9.2–12.9)
POTASSIUM SERPL-SCNC: 4 MMOL/L (ref 3.5–5.1)
PROT SERPL-MCNC: 6.8 G/DL (ref 6–8.4)
RBC # BLD AUTO: 5 M/UL (ref 4–5.4)
SODIUM SERPL-SCNC: 139 MMOL/L (ref 136–145)
TRIGL SERPL-MCNC: 301 MG/DL (ref 30–150)
WBC # BLD AUTO: 6.07 K/UL (ref 3.9–12.7)

## 2024-01-19 PROCEDURE — 80053 COMPREHEN METABOLIC PANEL: CPT | Performed by: FAMILY MEDICINE

## 2024-01-19 PROCEDURE — 85025 COMPLETE CBC W/AUTO DIFF WBC: CPT | Performed by: FAMILY MEDICINE

## 2024-01-19 PROCEDURE — 80061 LIPID PANEL: CPT | Performed by: FAMILY MEDICINE

## 2024-01-19 PROCEDURE — 99499 UNLISTED E&M SERVICE: CPT | Mod: 95,,, | Performed by: DIETITIAN, REGISTERED

## 2024-01-19 PROCEDURE — 83036 HEMOGLOBIN GLYCOSYLATED A1C: CPT | Performed by: FAMILY MEDICINE

## 2024-01-19 PROCEDURE — 36415 COLL VENOUS BLD VENIPUNCTURE: CPT | Mod: PO | Performed by: FAMILY MEDICINE

## 2024-01-19 PROCEDURE — 97803 MED NUTRITION INDIV SUBSEQ: CPT | Mod: 95,,, | Performed by: DIETITIAN, REGISTERED

## 2024-01-19 NOTE — PROGRESS NOTES
The patient location is: home ([place])  The chief complaint leading to consultation is: 3 months Medically Supervised Diet pending Gastric Bypass  Visit type: audiovisual     Face to Face time with patient: 25 mins    30 minutes of total time spent on the encounter, which includes face to face time and non-face to face time preparing to see the patient (eg, review of tests), Obtaining and/or reviewing separately obtained history, Documenting clinical information in the electronic or other health record, Independently interpreting results (not separately reported) and communicating results to the patient/family/caregiver, or Care coordination (not separately reported).       Each patient to whom he or she provides medical services by telemedicine is:  (1) informed of the relationship between the physician and patient and the respective role of any other health care provider with respect to management of the patient; and (2) notified that he or she may decline to receive medical services by telemedicine and may withdraw from such care at any time.    NUTRITION NOTE    Referring Physician: Undecided   Reason for MNT Referral: 3 months Medically Supervised Diet pending Gastric Bypass    Patient presents for follow-up visit for MSD with weight gain over the past two months;     CLINICAL DATA:  63 y.o. female.    Initial weight: 223 lbs  Current Weight:NA  Weight Change Since Initial Visit: NA lbs  Ideal Body Weight: 132 lbs  BMI: NA    DAILY NUTRITIONAL NEEDS: pre-op nutritional bariatric guidelines to promote weight loss  [id number] Calories    Grams Protein    CURRENT DIET:  Regular diet.  Diet Recall: Food records are not present.  Greatest challenge: starchy CHO and high-fat diet  Progress: Purchased protein shakes - Atkins (15 g pro), Pure Protein; tried protein bar - Elevation (9 g nestor, 1 g sugar), increased exercise     Current diet recall:      B: Bullet proof coffee (coffee with 1 tsp butter, coconut oil, and  Bulletproof collagen protein)  L:  Spinach enchilada, salad, beans, rice  D: Homemade chicken noodle soup (onions, carrots, celery, bow tie pasta)    Current Diet:     Diet Includes: 2 meals  Meal Pattern: Same.  Protein Supplements: 0 per day.  Snacking: None  Vegetables: Likes a few. Eats 1 x week. Spinach, avocados, broccoli, green beans, eggplant   Fruits: Likes a few. Eats 1 x  week. Grapefruit, berries, bananas, oranges  Beverages: Unsweetened iced tea  Dining out: 2-3 x week for lunch, 2-3 x week for dinner. Mostly restaurants and cafeteria. Mexican - enchiladas, rice, beans, Chinese - noodles and vegetables, Croatian - green sidhu with rice, Sushi - rolls with cream cheese and avocado, shrimp, tuna or sashimi  Cooking at home: 2-3 x week  Mostly baked, smothered, pan fried, and smothered meat and starchy CHO. Pork chops, shrimp creole, spaghetti, salad with kale, parm cheese, dried cranberries, mashed potatoes     Food Allergies:   None reported     Vitamins / Minerals / Herbs:   None     Labs:   No recent     Exercise:  Current exercise: Adequate  Rowing 5-6 x week 25 mins     Restrictions to exercise: None     Social:  Works regular daytime shifts. From home 2-3 x week 7:30am/8am-5:30pm/6pm or 8:30am-6pm in office  Lives with wife and father.  Grocery shopping and food prep wife.  Patient believes the household will be supportive after surgery.  Alcohol: None.  Smoking: None.    ASSESSMENT:  Patient demonstrates some willingness to change lifestyle habits as evidenced by good exercise.    Doing fairly well with working on greatest challenges (starchy CHO and high-fat diet).    Barriers to Education:  none  Stage of Change:  action    NUTRITION DIAGNOSIS:  Morbid Obesity related to Excessive carbohydrate intake, Excessive calorie intake, and Inadequate protein intake as evidence by BMI.  Obesity Status: Same    PT Plan  Protein drinks - 3 per week  Try to eliminate high fat/high sugar sweet  Eat more fruit - 3  per week    BARIATRIC DIET DISCUSSION/PLAN:  Discussed diet after surgery and related to patient's food record.  Reviewed nutrition guidelines for before and after surgery.  Answered all questions.  Reiterated protein and sugar requirements for protein shakes and bars  Discussed increasing protein - try protein pasta,   Discussed ways to increase fruit (in a smoothie, on yogurt, on salad, as snack)  Discussed keeping starchy CHO to no more than 1 serving per meal  Start including protein supplements in the diet plan daily.    RECOMMENDATIONS:  Pt is potential candidate for surgery.    Diet: Adjust diet plan.  Increase protein  Decrease starchy CHO    Exercise: Maintain.    Behavior Modification: Begin to document food & activity logs daily.    Return to clinic in one month.    Needs additional visits with RD.    Communicated nutrition plan with bariatric team.    SESSION TIME:  30 minutes

## 2024-01-22 ENCOUNTER — OFFICE VISIT (OUTPATIENT)
Dept: OBSTETRICS AND GYNECOLOGY | Facility: CLINIC | Age: 64
End: 2024-01-22
Payer: COMMERCIAL

## 2024-01-22 ENCOUNTER — OFFICE VISIT (OUTPATIENT)
Dept: FAMILY MEDICINE | Facility: CLINIC | Age: 64
End: 2024-01-22
Payer: COMMERCIAL

## 2024-01-22 VITALS
WEIGHT: 224.19 LBS | SYSTOLIC BLOOD PRESSURE: 130 MMHG | DIASTOLIC BLOOD PRESSURE: 80 MMHG | BODY MASS INDEX: 41.01 KG/M2

## 2024-01-22 VITALS
WEIGHT: 223.69 LBS | BODY MASS INDEX: 41.16 KG/M2 | SYSTOLIC BLOOD PRESSURE: 124 MMHG | HEART RATE: 83 BPM | DIASTOLIC BLOOD PRESSURE: 86 MMHG | TEMPERATURE: 98 F | HEIGHT: 62 IN | OXYGEN SATURATION: 97 %

## 2024-01-22 DIAGNOSIS — Z01.419 ENCOUNTER FOR GYNECOLOGICAL EXAMINATION WITHOUT ABNORMAL FINDING: Primary | ICD-10-CM

## 2024-01-22 DIAGNOSIS — I10 ESSENTIAL HYPERTENSION: ICD-10-CM

## 2024-01-22 DIAGNOSIS — Z12.31 BREAST CANCER SCREENING BY MAMMOGRAM: ICD-10-CM

## 2024-01-22 DIAGNOSIS — Z23 NEED FOR INFLUENZA VACCINATION: ICD-10-CM

## 2024-01-22 DIAGNOSIS — E78.5 HYPERLIPIDEMIA, UNSPECIFIED HYPERLIPIDEMIA TYPE: Primary | ICD-10-CM

## 2024-01-22 DIAGNOSIS — H81.10 BENIGN PAROXYSMAL POSITIONAL VERTIGO, UNSPECIFIED LATERALITY: ICD-10-CM

## 2024-01-22 DIAGNOSIS — Z85.42 HISTORY OF ENDOMETRIAL CANCER: ICD-10-CM

## 2024-01-22 DIAGNOSIS — Z87.898 HISTORY OF PREDIABETES: ICD-10-CM

## 2024-01-22 DIAGNOSIS — E66.01 CLASS 3 SEVERE OBESITY DUE TO EXCESS CALORIES WITH SERIOUS COMORBIDITY AND BODY MASS INDEX (BMI) OF 40.0 TO 44.9 IN ADULT: ICD-10-CM

## 2024-01-22 PROBLEM — E66.813 CLASS 3 SEVERE OBESITY DUE TO EXCESS CALORIES WITH SERIOUS COMORBIDITY AND BODY MASS INDEX (BMI) OF 40.0 TO 44.9 IN ADULT: Status: ACTIVE | Noted: 2024-01-22

## 2024-01-22 PROCEDURE — 3079F DIAST BP 80-89 MM HG: CPT | Mod: CPTII,S$GLB,, | Performed by: FAMILY MEDICINE

## 2024-01-22 PROCEDURE — 90471 IMMUNIZATION ADMIN: CPT | Mod: S$GLB,,, | Performed by: FAMILY MEDICINE

## 2024-01-22 PROCEDURE — 99386 PREV VISIT NEW AGE 40-64: CPT | Mod: S$GLB,,, | Performed by: OBSTETRICS & GYNECOLOGY

## 2024-01-22 PROCEDURE — 3044F HG A1C LEVEL LT 7.0%: CPT | Mod: CPTII,S$GLB,, | Performed by: FAMILY MEDICINE

## 2024-01-22 PROCEDURE — 3008F BODY MASS INDEX DOCD: CPT | Mod: CPTII,S$GLB,, | Performed by: OBSTETRICS & GYNECOLOGY

## 2024-01-22 PROCEDURE — 99999 PR PBB SHADOW E&M-EST. PATIENT-LVL IV: CPT | Mod: PBBFAC,,, | Performed by: OBSTETRICS & GYNECOLOGY

## 2024-01-22 PROCEDURE — 1159F MED LIST DOCD IN RCRD: CPT | Mod: CPTII,S$GLB,, | Performed by: FAMILY MEDICINE

## 2024-01-22 PROCEDURE — 3075F SYST BP GE 130 - 139MM HG: CPT | Mod: CPTII,S$GLB,, | Performed by: OBSTETRICS & GYNECOLOGY

## 2024-01-22 PROCEDURE — 99214 OFFICE O/P EST MOD 30 MIN: CPT | Mod: 25,S$GLB,, | Performed by: FAMILY MEDICINE

## 2024-01-22 PROCEDURE — 90686 IIV4 VACC NO PRSV 0.5 ML IM: CPT | Mod: S$GLB,,, | Performed by: FAMILY MEDICINE

## 2024-01-22 PROCEDURE — 1160F RVW MEDS BY RX/DR IN RCRD: CPT | Mod: CPTII,S$GLB,, | Performed by: FAMILY MEDICINE

## 2024-01-22 PROCEDURE — 3008F BODY MASS INDEX DOCD: CPT | Mod: CPTII,S$GLB,, | Performed by: FAMILY MEDICINE

## 2024-01-22 PROCEDURE — 3074F SYST BP LT 130 MM HG: CPT | Mod: CPTII,S$GLB,, | Performed by: FAMILY MEDICINE

## 2024-01-22 PROCEDURE — 99999 PR PBB SHADOW E&M-EST. PATIENT-LVL IV: CPT | Mod: PBBFAC,,, | Performed by: FAMILY MEDICINE

## 2024-01-22 PROCEDURE — 3044F HG A1C LEVEL LT 7.0%: CPT | Mod: CPTII,S$GLB,, | Performed by: OBSTETRICS & GYNECOLOGY

## 2024-01-22 PROCEDURE — 3079F DIAST BP 80-89 MM HG: CPT | Mod: CPTII,S$GLB,, | Performed by: OBSTETRICS & GYNECOLOGY

## 2024-01-22 PROCEDURE — 1159F MED LIST DOCD IN RCRD: CPT | Mod: CPTII,S$GLB,, | Performed by: OBSTETRICS & GYNECOLOGY

## 2024-01-22 RX ORDER — PRAVASTATIN SODIUM 10 MG/1
10 TABLET ORAL DAILY
Qty: 30 TABLET | Refills: 11 | Status: SHIPPED | OUTPATIENT
Start: 2024-01-22 | End: 2024-04-23 | Stop reason: SDUPTHER

## 2024-01-22 NOTE — PROGRESS NOTES
Assessment & Plan:    Hyperlipidemia, unspecified hyperlipidemia type  -     pravastatin (PRAVACHOL) 10 MG tablet; Take 1 tablet (10 mg total) by mouth once daily.  Dispense: 30 tablet; Refill: 11  -     Lipid Panel; Future; Expected date: 01/22/2024  -     Comprehensive Metabolic Panel; Future; Expected date: 01/22/2024    The 10-year ASCVD risk score (Arsh JEFFREY, et al., 2019) is: 6.9%    Values used to calculate the score:      Age: 63 years      Sex: Female      Is Non- : No      Diabetic: No      Tobacco smoker: No      Systolic Blood Pressure: 124 mmHg      Is BP treated: Yes      HDL Cholesterol: 35 mg/dL      Total Cholesterol: 214 mg/dL    Labs and ASCVD risk reviewed with patient. Would ideally start moderate intensity statin but patient prefers low intensity. Start pravastatin 10 mg daily. Discussed myalgias as the most common side effect. Repeat labs before f/u in 3 mo.    History of prediabetes  A1c normal.     Class 3 severe obesity due to excess calories with serious comorbidity and body mass index (BMI) of 40.0 to 44.9 in adult  Patient is following with Bariatrics. Discussed benefits and risks of weight loss surgery.    Benign paroxysmal positional vertigo, unspecified laterality  Controlled. Patient was informed that this may return and that Epley maneuver is the curative tx.    Essential hypertension  Controlled. Continue current therapy.     Need for influenza vaccination  -     Influenza - Quadrivalent (PF)      Health maintenance reviewed & addressed.  -Mammogram to be scheduled.    Follow-up: Follow up in about 3 months (around 4/22/2024).  ______________________________________________________________________    Chief Complaint  Chief Complaint   Patient presents with    Hyperlipidemia       HPI  Violetta Coe is a 63 y.o. female with medical diagnoses as listed in the medical history and problem list that presents to the office to follow up on her chronic conditions. She  is in her usual state of health today. She reports an episode of vertigo that was successfully treated with the Epley maneuver. She asks if this can return.     Most recent pertinent workup:     Last CBC Results:   Lab Results   Component Value Date    WBC 6.07 01/19/2024    HGB 13.9 01/19/2024    HCT 43.4 01/19/2024     01/19/2024       Last CMP Results  Lab Results   Component Value Date     01/19/2024    K 4.0 01/19/2024     01/19/2024    CO2 25 01/19/2024    BUN 18 01/19/2024    CREATININE 0.7 01/19/2024    CALCIUM 9.5 01/19/2024    ALBUMIN 3.7 01/19/2024    AST 22 01/19/2024    ALT 21 01/19/2024       Last Lipids  Lab Results   Component Value Date    CHOL 214 (H) 01/19/2024    TRIG 301 (H) 01/19/2024    HDL 35 (L) 01/19/2024    LDLCALC 118.8 01/19/2024   The 10-year ASCVD risk score (Arsh JEFFREY, et al., 2019) is: 6.9%    Values used to calculate the score:      Age: 63 years      Sex: Female      Is Non- : No      Diabetic: No      Tobacco smoker: No      Systolic Blood Pressure: 124 mmHg      Is BP treated: Yes      HDL Cholesterol: 35 mg/dL      Total Cholesterol: 214 mg/dL      Last A1C  Lab Results   Component Value Date    HGBA1C 5.5 01/19/2024         Health Maintenance         Date Due Completion Date    RSV Vaccine (Age 60+ and Pregnant patients) (1 - 1-dose 60+ series) Never done ---    COVID-19 Vaccine (4 - 2023-24 season) 09/01/2023 11/18/2022    Mammogram 02/01/2024 2/1/2023    Hemoglobin A1c (Prediabetes) 01/19/2025 1/19/2024    Colorectal Cancer Screening 01/27/2025 1/27/2020    Lipid Panel 01/19/2029 1/19/2024    TETANUS VACCINE 08/18/2030 8/18/2020              PAST MEDICAL HISTORY:  Past Medical History:   Diagnosis Date    GERD (gastroesophageal reflux disease)     Hx of colonic polyp 01/27/2020    Hyperlipidemia     Hypertension     Obesity, unspecified        PAST SURGICAL HISTORY:  Past Surgical History:   Procedure Laterality Date    BREAST  LUMPECTOMY      CATARACT EXTRACTION      CHOLECYSTECTOMY      COLONOSCOPY W/ POLYPECTOMY  01/27/2020    GALLBLADDER SURGERY      HYSTERECTOMY  12/01/2013    Endometrial Cancer       SOCIAL HISTORY:  Social History     Socioeconomic History    Marital status: Unknown   Tobacco Use    Smoking status: Never     Passive exposure: Never    Smokeless tobacco: Never   Substance and Sexual Activity    Alcohol use: Never    Drug use: Never     Social Determinants of Health     Financial Resource Strain: Low Risk  (12/13/2023)    Overall Financial Resource Strain (CARDIA)     Difficulty of Paying Living Expenses: Not hard at all   Food Insecurity: No Food Insecurity (12/13/2023)    Hunger Vital Sign     Worried About Running Out of Food in the Last Year: Never true     Ran Out of Food in the Last Year: Never true   Transportation Needs: No Transportation Needs (12/13/2023)    PRAPARE - Transportation     Lack of Transportation (Medical): No     Lack of Transportation (Non-Medical): No   Physical Activity: Insufficiently Active (12/13/2023)    Exercise Vital Sign     Days of Exercise per Week: 5 days     Minutes of Exercise per Session: 20 min   Stress: No Stress Concern Present (12/13/2023)    [place] [employer] - Occupational Stress Questionnaire     Feeling of Stress : Only a little   Social Connections: Unknown (12/13/2023)    Social Connection and Isolation Panel [NHANES]     Frequency of Communication with Friends and Family: Once a week     Frequency of Social Gatherings with Friends and Family: Once a week     Active Member of Clubs or Organizations: No     Attends Club or Organization Meetings: Patient declined     Marital Status:    Housing Stability: Low Risk  (12/13/2023)    Housing Stability Vital Sign     Unable to Pay for Housing in the Last Year: No     Number of Places Lived in the Last Year: 2     Unstable Housing in the Last Year: No       FAMILY HISTORY:  Family History  "  Problem Relation Age of Onset    Hypertension Mother     Diabetes Mother     Stroke Mother     Breast cancer Mother     Hypertension Father     Hypothyroidism Father     Prostate cancer Father     Hypertension Brother        ALLERGIES AND MEDICATIONS: updated and reviewed.  Review of patient's allergies indicates:  No Known Allergies  Current Outpatient Medications   Medication Sig Dispense Refill    CALCIUM ORAL Take by mouth.      cholecalciferol, vitamin D3, 1,250 mcg (50,000 unit) capsule Take 1 tablet by mouth.      lisinopriL-hydrochlorothiazide (PRINZIDE,ZESTORETIC) 20-12.5 mg per tablet Take 1 tablet by mouth once daily. 90 tablet 3    MAGNESIUM OXIDE ORAL Take 1 tablet by mouth once daily.      multivitamin with minerals tablet Take 1 tablet by mouth once daily.      nystatin (MYCOSTATIN) powder Apply topically.      omeprazole (PRILOSEC) 20 MG capsule Take 20 mg by mouth.      VITAMIN B COMPLEX ORAL Take 1 tablet by mouth once daily.      FLOWFLEX COVID-19 AG HOME TEST Kit       pravastatin (PRAVACHOL) 10 MG tablet Take 1 tablet (10 mg total) by mouth once daily. 30 tablet 11     No current facility-administered medications for this visit.         ROS  Review of Systems   Constitutional:  Positive for unexpected weight change (following with Bariatrics, considering weight loss surgery).   Gastrointestinal:  Positive for diarrhea (improved with fiber supplementation).   Neurological:  Positive for dizziness (now resolved).           Physical Exam  Vitals:    01/22/24 1649   BP: 124/86   BP Location: Right arm   Patient Position: Sitting   BP Method: Large (Manual)   Pulse: 83   Temp: 97.8 °F (36.6 °C)   TempSrc: Oral   SpO2: 97%   Weight: 101.5 kg (223 lb 10.5 oz)   Height: 5' 2" (1.575 m)    Body mass index is 40.91 kg/m².  Weight: 101.5 kg (223 lb 10.5 oz)   Height: 5' 2" (157.5 cm)   Physical Exam  Constitutional:       General: She is not in acute distress.     Appearance: She is obese.   HENT:      " Head: Normocephalic and atraumatic.   Neck:      Thyroid: No thyromegaly.      Vascular: No carotid bruit.   Cardiovascular:      Rate and Rhythm: Normal rate and regular rhythm.      Pulses: Normal pulses.      Heart sounds: Normal heart sounds.   Pulmonary:      Effort: Pulmonary effort is normal. No respiratory distress.      Breath sounds: Normal breath sounds.   Musculoskeletal:      Cervical back: Neck supple.      Right lower leg: No edema.      Left lower leg: No edema.   Lymphadenopathy:      Cervical: No cervical adenopathy.   Skin:     General: Skin is warm and dry.      Findings: No rash.   Neurological:      General: No focal deficit present.      Mental Status: She is alert and oriented to person, place, and time.   Psychiatric:         Mood and Affect: Mood normal.         Behavior: Behavior normal.         Thought Content: Thought content normal.

## 2024-01-22 NOTE — PROGRESS NOTES
Subjective:      Patient ID: Violetta Coe is a 63 y.o. female.    Chief Complaint:  Well Woman      History of Present Illness  HPI  Annual Exam-Postmenopausal  Patient presents for annual exam. The patient has no complaints today. The patient is not currently sexually active. GYN screening history: last pap: was normal and last mammogram: was normal. The patient is not taking hormone replacement therapy. Patient denies post-menopausal vaginal bleeding. The patient wears seatbelts: yes. The patient participates in regular exercise: yes. Has the patient ever been transfused or tattooed?: no. The patient reports that there is not domestic violence in her life.    S/p hysterectomy with BSO for endometrial cancer.    GYN & OB History  No LMP recorded. Patient has had a hysterectomy.   Date of Last Pap: No result found    OB History    Para Term  AB Living   2       2     SAB IAB Ectopic Multiple Live Births                  # Outcome Date GA Lbr Mayito/2nd Weight Sex Delivery Anes PTL Lv   2 AB            1 AB              Past Medical History:  Past Medical History:   Diagnosis Date    GERD (gastroesophageal reflux disease)     Hx of colonic polyp 2020    Hyperlipidemia     Hypertension     Obesity, unspecified        Past Surgical History:  Past Surgical History:   Procedure Laterality Date    BREAST LUMPECTOMY      CATARACT EXTRACTION      CHOLECYSTECTOMY      COLONOSCOPY W/ POLYPECTOMY  2020    GALLBLADDER SURGERY      HYSTERECTOMY  2013    Endometrial Cancer       Family History:  Family History   Problem Relation Age of Onset    Hypertension Mother     Diabetes Mother     Stroke Mother     Breast cancer Mother     Hypertension Father     Hypothyroidism Father     Prostate cancer Father     Hypertension Brother        Allergies:  Review of patient's allergies indicates:  No Known Allergies    Medications:  Current Outpatient Medications on File Prior to Visit   Medication Sig Dispense  Refill    CALCIUM ORAL Take by mouth.      cholecalciferol, vitamin D3, 1,250 mcg (50,000 unit) capsule Take 1 tablet by mouth.      FLOWFLEX COVID-19 AG HOME TEST Kit       lisinopriL-hydrochlorothiazide (PRINZIDE,ZESTORETIC) 20-12.5 mg per tablet Take 1 tablet by mouth once daily. 90 tablet 3    MAGNESIUM OXIDE ORAL Take 1 tablet by mouth once daily.      multivitamin with minerals tablet Take 1 tablet by mouth once daily.      nystatin (MYCOSTATIN) powder Apply topically.      omeprazole (PRILOSEC) 20 MG capsule Take 20 mg by mouth.      varicella-zoster gE-AS01B, PF, (SHINGRIX) 50 mcg/0.5 mL injection Inject 0.5 mLs into the muscle.      VITAMIN B COMPLEX ORAL Take 1 tablet by mouth once daily.       No current facility-administered medications on file prior to visit.       Social History:  Social History     Tobacco Use    Smoking status: Never     Passive exposure: Never    Smokeless tobacco: Never   Substance Use Topics    Alcohol use: Never    Drug use: Never            Review of Systems  Review of Systems   Constitutional: Negative.    HENT: Negative.     Eyes: Negative.    Respiratory: Negative.     Cardiovascular: Negative.    Gastrointestinal: Negative.    Endocrine: Negative.    Genitourinary: Negative.    Musculoskeletal: Negative.    Integumentary:  Negative.   Neurological: Negative.    Hematological: Negative.    Psychiatric/Behavioral: Negative.     Breast: negative.           Objective:     Physical Exam:   Constitutional: She is oriented to person, place, and time. She appears well-nourished.    HENT:   Head: Normocephalic and atraumatic.    Eyes: EOM are normal. Right eye exhibits normal extraocular motion. Left eye exhibits normal extraocular motion.    Neck: No thyromegaly present.    Cardiovascular:  Normal rate.             Pulmonary/Chest: Effort normal. No respiratory distress. Right breast exhibits no mass, no skin change and no tenderness. Left breast exhibits no mass, no skin change and  no tenderness. Breasts are symmetrical.        Abdominal: Soft. She exhibits no distension and no mass. There is no abdominal tenderness. Hernia confirmed negative in the right inguinal area and confirmed negative in the left inguinal area.     Genitourinary:    Vagina, right adnexa and left adnexa normal.      Pelvic exam was performed with patient supine.   The external female genitalia was normal.   No external genitalia lesions identified,Genitalia hair distrobution normal .     Labial bartholins normal.There is no rash or lesion on the right labia. There is no rash or lesion on the left labia. Right adnexum displays no tenderness and no fullness. Left adnexum displays no tenderness and no fullness. No vaginal discharge, bleeding or prolapse of vaginal walls in the vagina. Vagina was moist.Cervix is absent.Uterus is absent. Normal urethral meatus.Urethral Meatus exhibits: urethral lesionUrethra findings: no urethral mass, no tenderness and prolapsedBladder findings: no bladder distention and no bladder tenderness          Musculoskeletal: Normal range of motion.      Lymphadenopathy:     She has no cervical adenopathy.    Neurological: She is oriented to person, place, and time.   Cranial Nerves II-XII grossly intact.    Skin: No rash noted. No erythema.    Psychiatric: She has a normal mood and affect. Her behavior is normal.         Assessment:     1. Encounter for gynecological examination without abnormal finding    2. Breast cancer screening by mammogram    3. History of endometrial cancer               Plan:     1. Encounter for gynecological examination without abnormal finding  - Pap no longer indicated as  patient is s/p hysterectomy for endometrial cancer .  -   Screening tests as ordered.  - Diet and exercise encouraged.    Counseling: injury prevention: Driving under the influence of alcohol  Seatbelts  Perimenopause/Menopause  Stress management techniques  indications for and frequency of periodic  gynecologic exam  reviewed current Pap guidelines. Explained new understanding of natural history of cervical disease and improved Paps. Recommended guideline concordant care.    2. Breast cancer screening by mammogram  - Self breast exams encouraged    3. History of endometrial cancer  - Ambulatory referral/consult to Breast Surgery; Future - to discuss BRCA testing.

## 2024-02-02 ENCOUNTER — HOSPITAL ENCOUNTER (OUTPATIENT)
Dept: RADIOLOGY | Facility: HOSPITAL | Age: 64
Discharge: HOME | End: 2024-02-02
Attending: FAMILY MEDICINE
Payer: COMMERCIAL

## 2024-02-02 DIAGNOSIS — Z12.31 OTHER SCREENING MAMMOGRAM: ICD-10-CM

## 2024-02-02 PROCEDURE — 77067 SCR MAMMO BI INCL CAD: CPT | Mod: TC,PO

## 2024-02-02 PROCEDURE — 77067 SCR MAMMO BI INCL CAD: CPT | Mod: 26,,, | Performed by: RADIOLOGY

## 2024-02-02 PROCEDURE — 77063 BREAST TOMOSYNTHESIS BI: CPT | Mod: 26,,, | Performed by: RADIOLOGY

## 2024-02-09 ENCOUNTER — TELEPHONE (OUTPATIENT)
Dept: OBSTETRICS AND GYNECOLOGY | Facility: CLINIC | Age: 64
End: 2024-02-09
Payer: COMMERCIAL

## 2024-02-09 DIAGNOSIS — Z85.42 HISTORY OF ENDOMETRIAL CANCER: Primary | ICD-10-CM

## 2024-02-14 ENCOUNTER — PATIENT MESSAGE (OUTPATIENT)
Dept: BARIATRICS | Facility: CLINIC | Age: 64
End: 2024-02-14
Payer: COMMERCIAL

## 2024-02-14 ENCOUNTER — TELEPHONE (OUTPATIENT)
Dept: HEMATOLOGY/ONCOLOGY | Facility: CLINIC | Age: 64
End: 2024-02-14
Payer: COMMERCIAL

## 2024-02-15 NOTE — PROGRESS NOTES
The patient location is: home ([place])  The chief complaint leading to consultation is: 3 months Medically Supervised Diet pending Gastric Bypass  Visit type: audiovisual     Face to Face time with patient: 20 min    30 minutes of total time spent on the encounter, which includes face to face time and non-face to face time preparing to see the patient (eg, review of tests), Obtaining and/or reviewing separately obtained history, Documenting clinical information in the electronic or other health record, Independently interpreting results (not separately reported) and communicating results to the patient/family/caregiver, or Care coordination (not separately reported).       Each patient to whom he or she provides medical services by telemedicine is:  (1) informed of the relationship between the physician and patient and the respective role of any other health care provider with respect to management of the patient; and (2) notified that he or she may decline to receive medical services by telemedicine and may withdraw from such care at any time.    NUTRITION NOTE    Referring Physician: Undecided   Reason for MNT Referral: 3 months Medically Supervised Diet pending Gastric Bypass    Patient presents for follow-up visit for MSD with weight at a standstill over the past three months; 0 lbs total weight loss    CLINICAL DATA:  63 y.o. female.    Initial weight: 223 lbs  Current Weight: 223 lbs (1/22/24)  Weight Change Since Initial Visit: 0 lbs  Ideal Body Weight: 132 lbs  BMI: 40.91    DAILY NUTRITIONAL NEEDS: pre-op nutritional bariatric guidelines to promote weight loss  7917-5002 Calories    Grams Protein    CURRENT DIET:  Regular diet.  Diet Recall: Food records are not present.  Greatest challenge: starchy CHO and high-fat diet  Progress: Increased fruit; Tried Pure Protein, did not like chemical taste     Current diet recall:      B: Bullet proof coffee (coffee with 1 tsp butter, coconut oil, and Bulletproof  collagen protein)  L: 5 Chinese dumplings, hot and sour soup  D: 6 oz steak with broccoli and mushrooms     Current Diet:     Diet Includes: 2 meals  Meal Pattern: Same.  Protein Supplements: 0 per day.  Snacking: None  Vegetables: Likes a few. Eats 5-6 x week. Spinach, avocados, broccoli, green beans, eggplant   Fruits: Likes a few. Eats 3 x week. Grapes, apples, grapefruit, oranges  Beverages: Unsweetened iced tea  Dining out: 2-3 x week for lunch, 2-3 x week for dinner. Mostly restaurants and cafeteria. Mexican - enchiladas, rice, beans, Chinese - noodles and vegetables, Upper sorbian - green sidhu with rice, Sushi - rolls with cream cheese and avocado, shrimp, tuna or sashimi  Cooking at home: 2-3 x week  Mostly baked, smothered, pan fried, and smothered meat and starchy CHO. Pork chops, shrimp creole, spaghetti, salad with kale, parm cheese, dried cranberries, mashed potatoes     Food Allergies:   None reported     Vitamins / Minerals / Herbs:   None     Labs:   No recent     Exercise:  Current exercise: Adequate  Rowing 5-6 x week 25 mins     Restrictions to exercise: None     Social:  Works regular daytime shifts. From home 2-3 x week 7:30am/8am-5:30pm/6pm or 8:30am-6pm in office  Lives with wife and father.  Grocery shopping and food prep wife.  Patient believes the household will be supportive after surgery.  Alcohol: None.  Smoking: None.    ASSESSMENT:  Patient demonstrates some willingness to change lifestyle habits as evidenced by good exercise, dietary changes, increased fruits, and increased vegetables.    Doing well with working on greatest challenges (starchy CHO and high-fat diet).    Barriers to Education:  none  Stage of Change:  action    NUTRITION DIAGNOSIS:  Morbid Obesity related to Excessive carbohydrate intake, Excessive calorie intake, and Inadequate protein intake as evidence by BMI.  Obesity Status: Same    BARIATRIC DIET DISCUSSION/PLAN:  Discussed diet after surgery and related to patient's  food record.  Reviewed nutrition guidelines for before and after surgery.  Answered all questions.  Discussed pre-op liquid diet - handout provided in AVS  Discussed vitamins and minerals - handout provided in AVS  Discussed plant-based protein shakes and powders - handout provided in AVS  Discussed protein bars - handout provided in AVS  Keep trying various protein supplements to determine preference.  Start including protein supplements in the diet plan daily.  Start shopping for bariatric vitamins & minerals.    RECOMMENDATIONS:  Pt is good candidate for surgery.    Diet: Adjust diet plan.  Start including protein supplements in the diet plan daily.    Exercise: Maintain.    Behavior Modification: Begin to document food & activity logs daily.    Communicated nutrition plan with bariatric team.    SESSION TIME:  30 minutes

## 2024-02-16 ENCOUNTER — CLINICAL SUPPORT (OUTPATIENT)
Dept: BARIATRICS | Facility: CLINIC | Age: 64
End: 2024-02-16
Payer: COMMERCIAL

## 2024-02-16 DIAGNOSIS — E66.01 MORBID OBESITY WITH BMI OF 40.0-44.9, ADULT: ICD-10-CM

## 2024-02-16 DIAGNOSIS — K21.9 GASTROESOPHAGEAL REFLUX DISEASE, UNSPECIFIED WHETHER ESOPHAGITIS PRESENT: ICD-10-CM

## 2024-02-16 DIAGNOSIS — G47.33 OSA ON CPAP: ICD-10-CM

## 2024-02-16 DIAGNOSIS — I10 HYPERTENSION, UNSPECIFIED TYPE: Primary | ICD-10-CM

## 2024-02-16 DIAGNOSIS — Z71.3 DIETARY COUNSELING AND SURVEILLANCE: ICD-10-CM

## 2024-02-16 PROCEDURE — 99499 UNLISTED E&M SERVICE: CPT | Mod: 95,,, | Performed by: DIETITIAN, REGISTERED

## 2024-02-16 PROCEDURE — 97803 MED NUTRITION INDIV SUBSEQ: CPT | Mod: 95,,, | Performed by: DIETITIAN, REGISTERED

## 2024-02-16 NOTE — PATIENT INSTRUCTIONS
Plant-Based Protein Shakes and Powders  Plant-based protein shakes and powders are a good option for people with lactose intolerance or following a vegan diet. They are generally made with either pea protein, soy protein, or a combination of the two. Choose plant-based protein shakes and powders with no more than 4 g of sugar per serving.     Plant Based Protein Shakes     Evolve  20 g protein   Owyn  20 g protein   Purely Inspired  20 g protein       Plant-Based Protein Powders      Equate  30 g protein   Evolve  20 g protein   Garden of Life  20 g protein   Kos  20 g protein     Orgain  21 g protein   Premier Protein  25 g protein    Purely Inspired  20 g protein   Sams  20 g protein       Pre-Op Liquid Diet  Starts 2 weeks before surgery    When choosing a protein drink, please read the label. Make sure there is the protein drink has at least 20 grams of protein, no more than 4 grams of sugar per serving, and 100% whey protein (not collagen) or soy or pea protein if plant-based.    Aim to take in 600-800 calories and  grams of protein per day while on the liquid diet.     Ready-to-Drink Protein Shakes    Premier Protein  30 g protein   Equate High Performance  30 g protein   Muscle Milk*  30 g protein     DashThis Core Power*  26 g protein   DashThis Nutrition Plan*  30 g protein   Quest  30 g protein     GNC Total Lean*  20 g protein   Ensure Max Protein  30 g protein   Boost Glucose Control Max  30 g protein   * Lactose free    Protein Powders  Protein powders may be mixed with water, fat-free or 1% milk, and unsweetened non-dairy milks like almond and soy.          25 g protein   ISOPURE+  20-25 g protein   Pure Protein  25 g protein     Body Fortress  30 g protein   Unjury+  21 g protein Orgain*  21 g protein       Thuan Seb*+  24 g protein   Muscle Milk*  32 g protein   Premier Protein+  30 g protein   * Lactose free  + Available flavored and unflavored      Ready-to-Drink Protein  Patino    Protein 2.0  20 g protein   Premier Clear  20 g protein   Isopure Zero Carb                 32 g protein     Protein Soups  Choose soups without any solids (noodles, vegetables, etc.) in them, such as bouillon and cream soups. If you have hypertension, look for soups with sodium under 20% per serving.    Bariatric Pal  15 g protein   Unjury  21 g protein   Celebrate  15 g protein     Low Calorie/Sugar-Free Drinks  All fluids before and after surgery should be sugar-free, non-carbonated, and low calorie - no more than 15 calories per serving.      Plain water   Infuse with fruit as desired   Non-carbonated flavored water   Unsweetened tea  (no green tea)     Sugar-free drink powders   Sugar-free drink drops   Diet juices     Sugar-free Jell-o   Sugar-free popsicles   Low sodium and bone broths     Tea/Coffee  Limit caffeinated tea or coffee to one 12 oz serving per day.  No green tea of any kind.    To forrest, use a sugar substitute like Splenda, stevia, etc. To lighten, use fat free or 1% milk, unsweetened almond or soy milk, or a sugar-free creamer. Watch the serving size of the milks or creamer! Remember, no more than 15 calories per serving. Or, try using a little protein shake!    Bariatric Vitamin and Mineral Needs    Bariatric surgery can change the way your body absorbs certain vitamins and minerals. With a smaller stomach, it is also harder for you to get all of the nutrients you need through food. It is essential to take these vitamins and minerals for the rest of your life to avoid nutrient deficiency.    Remember:  *NO gummy multivitamins due to poor quality and sugar content  *Do NOT take calcium citrate and iron within 2 hours of each other due to poor absorption  *Pills may be swallowed if they are the size of a No. 2 pencil eraser (or smaller). They may be cut to this size with a pill cutter and swallowed if tolerated.    Sleeve Gastrectomy: No swallowing large whole pills for 2 weeks after  surgery  Gastric Bypass: No swallowing large whole pills for 1 month after surgery    Vitamins and minerals can be purchased in stores and at these on bariatric specialty sites:  https://www.Linkfluence.Unowhy/ (use verification code [facility] for discount)  https://store.[employer].com/collections/bariatric-vitamins (use discount code TGJHP662 for 20% off your first order)  [email or web address]/  https://www.bariatricfusion.com/  https://[employer].com/

## 2024-02-19 ENCOUNTER — TELEPHONE (OUTPATIENT)
Dept: ENDOSCOPY | Facility: HOSPITAL | Age: 64
End: 2024-02-19
Payer: COMMERCIAL

## 2024-02-19 NOTE — TELEPHONE ENCOUNTER
Spoke with pt to schedule EGD. Pt wants to speak with md before deciding to schedule procedure. Will defer for 3 weeks per pt request

## 2024-02-20 ENCOUNTER — PATIENT MESSAGE (OUTPATIENT)
Dept: BARIATRICS | Facility: CLINIC | Age: 64
End: 2024-02-20
Payer: COMMERCIAL

## 2024-02-29 ENCOUNTER — PATIENT MESSAGE (OUTPATIENT)
Dept: BARIATRICS | Facility: CLINIC | Age: 64
End: 2024-02-29
Payer: COMMERCIAL

## 2024-03-06 ENCOUNTER — OFFICE VISIT (OUTPATIENT)
Dept: GENETICS | Facility: CLINIC | Age: 64
End: 2024-03-06
Payer: COMMERCIAL

## 2024-03-06 ENCOUNTER — PATIENT MESSAGE (OUTPATIENT)
Dept: BARIATRICS | Facility: CLINIC | Age: 64
End: 2024-03-06
Payer: COMMERCIAL

## 2024-03-06 DIAGNOSIS — Z80.3 FAMILY HISTORY OF BREAST CANCER: Primary | ICD-10-CM

## 2024-03-06 DIAGNOSIS — Z80.0 FAMILY HISTORY OF PANCREATIC CANCER: ICD-10-CM

## 2024-03-06 DIAGNOSIS — Z80.42 FAMILY HISTORY OF PROSTATE CANCER: ICD-10-CM

## 2024-03-06 DIAGNOSIS — Z85.42 HISTORY OF ENDOMETRIAL CANCER: ICD-10-CM

## 2024-03-06 PROCEDURE — 96040 PR GENETIC COUNSELING, EACH 30 MIN: CPT | Mod: 95,,,

## 2024-03-06 NOTE — PROGRESS NOTES
"  Cancer Genetics  Hereditary/High Risk Clinic  Department of Hematology and Oncology  [facility]        Date of Service:  2024  Provider:  Tyler Vazquez MS, [facility]    Patient Information  Name:  Dontae Coe  :  1960  MRN:  [id number]        Referring Provider: Ruba Cisneros MD     Televisit Information  The patient location is: [place], [place].  The chief complaint leading to consultation is: as below.  Visit type: audiovisual.  Face-to-face time with patient: approximately 50 minutes.  Approximately 100 minutes in total were spent on this encounter, which includes face-to-face time and non-face-to-face time preparing to see the patient (e.g., review of tests), obtaining and/or reviewing separately obtained history, documenting clinical information in the electronic or other health record, independently interpreting results (not separately reported) and communicating results to the patient/family/caregiver, or care coordination (not separately reported).  Each patient to whom he or she provides medical services by telemedicine is:  (1) informed of the relationship between the physician and patient and the respective role of any other health care provider with respect to management of the patient; and (2) notified that he or she may decline to receive medical services by telemedicine and may withdraw from such care at any time.      SUBJECTIVE:      Chief Complaint: Genetic Counseling    History of Present Illness (HPI):  Dontae Coe ("DONTAE"), 63 y.o., assigned female sex at birth is new to the [facility] Department of Hematology and Oncology and to me.  She was referred by  Obstetrics and Gynecology  for genetic cancer risk assessment given her personal history of endometrial cancer. At ~age 52, she was diagnosed with endometrial cancer (MMR unknown) s/p EAGLE/BSO. No chemo/radiation recommended, per Ms Saravanan.    Focused Medical History:   Germline cancer-genetic testing:  No  Cancer:  " Yes  Endometrial cancer (2013)  Colonoscopy: Yes  Most recent colonoscopy: 01/27/2020, told to repeat in 5 years  Colon polyp:  Yes - 5-10 polyps  Mammogram: Yes  Most recent mammo: 02/02/2024  Breast MRI:  No  Other benign tumor:  No  cervical polyps  Pancreatitis:  No  Pancreatic cyst:  No     Focused Surgical History  Right breast lumpectomy (age 20s)  Breast cyst aspiration (age 20s)  BALJIT GUILLERMO (12/01/2013)    Ancestry:  Ashkenazi Mormon ancestry:  No  Paternal: [place], [place]e, [place],   Maternal: [place], [place]e, [place]    Focused Family History:  Consanguinity in ancestors:  No  Germline cancer-genetic testing in blood relatives:  No  Cancer in family:  Yes; there are no known blood relatives affected with cancer other than those mentioned in the pedigree below    Family Cancer Pedigree:           Review of Systems:  See HPI.      SUBJECTIVE:   Records Reviewed  Medical History  Problem List  Any pertinent, available Procedures and Pathology reports in both [facility] and [facility]    COUNSELING   Causes of cancer  Germline cancer genetic testing is the testing of genes associated with cancer, known as cancer susceptibility genes.  Just as these genes are inherited from parents, mutations in these genes can be inherited, as well.  A mutation in a cancer susceptibility gene adversely affects the gene's ability to prevent cancer; therefore, carriers of cancer susceptibility gene mutations may be at increased risk for certain cancers.     Only 5-10% cancers are caused by a cancer susceptibility gene mutation, meaning the cancer is genetic/hereditary; rather, most cancers are sporadic.  Causes of sporadic cancers may include environmental risk factors, lifestyle risk factors, and non-modifiable risk factors.  It is important to note that members of a family often share not only their genetics but also risk factors including environmental and lifestyle risk factors, so cancers can be  familial.    Mutations in BRCA1 and BRCA2 are associated with an increased risk for breast and ovarian cancer, in addition to male breast cancer, pancreatic, melanoma, and prostate cancer. Mutations in other genes may increase the risk of breast and prostate cancer, in addition to other cancers.    Mutations in MLH1, MSH2, MSH6, PSM2, and EPCAM are associated with Rebolledo syndrome. Typically, individuals with Rebolledo syndrome have increased risks for colorectal and endometrial cancer, as well as, ovarian, gastric, small bowel, ureter/renal pelvis, pancreatic, hepatobiliary tract, sebaceous neoplasms, brain, and prostate cancer. Discussed that mutations in other genes may increase the risks for these cancers.    Potential results of genetic testing, and their implications  Potential results of genetic testing include positive, negative, and variant of unknown significance (VUS).    A positive result indicates the presence of at least one clinically significant mutation, and the patient's associated cancer risks vary depending upon the cancer susceptibility gene(s) in which there is/are a mutation(s).  With a positive result, in some cases, depending upon the specific result and the patient's clinical history, modified risk management may be recommended, including measures for risk reduction and/or surveillance; however, modified management is not always an option.    A negative result indicates that no clinically significant mutations were identified in the gene(s) tested.    A VUS indicates that there is not presently enough data for the laboratory to make a determination as to whether the variant is clinically significant.  VUSs are not typically acted upon clinically.        Modified management may also be recommended, even with a result of no or unknown significance, based upon risk assessment that incorporates the family history.  Sometimes, depending upon the genetic testing result and the cancer diagnosis,  additional/modified treatments may be an option, though this is not guaranteed.    Amanda Danielle Score  Breast Cancer Risk Stratification    I recalculated your current estimated lifetime risk of breast cancer today, utilizing the Tyrer-Cuzick risk-assessment model version 8.0b, and it was 23.51%.  This places you in the high-lifetime-risk rangefor breast cancer, according to current clinical guidelines.  As such, the NCCN guidelines recommend that she continue annual mammograms, as well as begin annual breast MRI spaced 6 months apart from mammogram.     Genetic mutation inheritance  If  DONTAE tests positive for a mutation, her first-degree relatives would each have a 50% chance of having the same mutation, and other, more distantly related blood-relatives would also be at risk of having the same mutation.       Genetic discrimination  The Genetic Information Nondiscrimination Act (MAGNOLIA) is U[place]S. federal legislation that provides some protections against use of an individual's genetic information by their health insurer and by their employer.  Title I of MAGNOLIA prohibits most health insurers from utilizing an individual's genetic information to make decisions regarding insurance eligibility or premium charges.  Title II of MAGNOLIA prohibits covered entities, including employers, from requesting the genetic information of employees and applicants.  MAGNOLIA does not protect individuals from genetic discrimination toward health insurance obtained through a job with the  or through the Federal Employees Health Benefits Plan; from genetic discrimination by employers with fewer than 15 employees or if employed by the ; or from genetic discrimination by any other type of policies/entities, including but not limited to life insurance, disability insurance, long-term care insurance,  benefits, and [employer] benefits.     Genetic testing logistics  An outside laboratory would perform the testing after  a blood sample is collected at [facility] or a saliva sample is collected by the patient at home.  With genetic testing, there is a potential for the patient to incur out-of-pocket costs.  Results can take 2-3 weeks.  Post-test genetic counseling can be conducted once the genetic testing results are available.     Assessment/Plan  Based on the information provided by Violetta, she meets current criteria for genetic testing of hereditary  according to current clinical guidelines. Violetta's clinical history, including personal history and/or family history, is strongly suggestive of a hereditary cancer syndrome in the family given the maternal family history of breast cancer in 3 second-degree relatives as well as the maternal family history of pancreatic cancer in a second degree-relative.     Offered germline cancer-genetic testing at this time versus deferring testing at this time or declining testing altogether.  Various test panel options were discussed. Violetta decided to proceed with genetic testing through the [employer] BRCA1 and BRCA2 gene sequence and deletion/duplication and 77-gene [employer] Expanded Cancer Panel. Violetta has provided her informed consent to proceed. Violetta is scheduled for a blood draw on 03/08/2024.      Questions were encouraged and answered to the patient's satisfaction, and she verbalized understanding of information and agreement with the plan.         Signed,    Tyler Vazquez MS, [facility]  Certified Genetic Counselor, Hereditary and High-Risk Clinic  Hematology/Oncology, [facility]    03/06/2024

## 2024-03-08 ENCOUNTER — LAB VISIT (OUTPATIENT)
Dept: LAB | Facility: HOSPITAL | Age: 64
End: 2024-03-08
Attending: FAMILY MEDICINE
Payer: COMMERCIAL

## 2024-03-08 DIAGNOSIS — Z85.42 HISTORY OF ENDOMETRIAL CANCER: ICD-10-CM

## 2024-03-08 DIAGNOSIS — Z80.3 FAMILY HISTORY OF BREAST CANCER: ICD-10-CM

## 2024-03-08 DIAGNOSIS — Z80.42 FAMILY HISTORY OF PROSTATE CANCER: ICD-10-CM

## 2024-03-08 DIAGNOSIS — Z80.0 FAMILY HISTORY OF PANCREATIC CANCER: ICD-10-CM

## 2024-03-08 PROCEDURE — 36415 COLL VENOUS BLD VENIPUNCTURE: CPT | Performed by: FAMILY MEDICINE

## 2024-04-02 ENCOUNTER — TELEPHONE (OUTPATIENT)
Dept: ENDOSCOPY | Facility: HOSPITAL | Age: 64
End: 2024-04-02
Payer: COMMERCIAL

## 2024-04-02 VITALS — BODY MASS INDEX: 41.04 KG/M2 | WEIGHT: 223 LBS | HEIGHT: 62 IN

## 2024-04-03 ENCOUNTER — PATIENT MESSAGE (OUTPATIENT)
Dept: BARIATRICS | Facility: CLINIC | Age: 64
End: 2024-04-03
Payer: COMMERCIAL

## 2024-04-09 ENCOUNTER — PATIENT MESSAGE (OUTPATIENT)
Dept: BARIATRICS | Facility: CLINIC | Age: 64
End: 2024-04-09
Payer: COMMERCIAL

## 2024-04-19 ENCOUNTER — LAB VISIT (OUTPATIENT)
Dept: LAB | Facility: HOSPITAL | Age: 64
End: 2024-04-19
Attending: FAMILY MEDICINE
Payer: COMMERCIAL

## 2024-04-19 DIAGNOSIS — E78.5 HYPERLIPIDEMIA, UNSPECIFIED HYPERLIPIDEMIA TYPE: ICD-10-CM

## 2024-04-19 LAB
ALBUMIN SERPL BCP-MCNC: 3.7 G/DL (ref 3.5–5.2)
ALP SERPL-CCNC: 78 U/L (ref 55–135)
ALT SERPL W/O P-5'-P-CCNC: 22 U/L (ref 10–44)
ANION GAP SERPL CALC-SCNC: 12 MMOL/L (ref 8–16)
AST SERPL-CCNC: 19 U/L (ref 10–40)
BILIRUB SERPL-MCNC: 0.4 MG/DL (ref 0.1–1)
BUN SERPL-MCNC: 15 MG/DL (ref 8–23)
CALCIUM SERPL-MCNC: 9.4 MG/DL (ref 8.7–10.5)
CHLORIDE SERPL-SCNC: 105 MMOL/L (ref 95–110)
CHOLEST SERPL-MCNC: 182 MG/DL (ref 120–199)
CHOLEST/HDLC SERPL: 4.7 {RATIO} (ref 2–5)
CO2 SERPL-SCNC: 24 MMOL/L (ref 23–29)
CREAT SERPL-MCNC: 0.7 MG/DL (ref 0.5–1.4)
EST. GFR  (NO RACE VARIABLE): >60 ML/MIN/1.73 M^2
GLUCOSE SERPL-MCNC: 102 MG/DL (ref 70–110)
HDLC SERPL-MCNC: 39 MG/DL (ref 40–75)
HDLC SERPL: 21.4 % (ref 20–50)
LDLC SERPL CALC-MCNC: 94.6 MG/DL (ref 63–159)
NONHDLC SERPL-MCNC: 143 MG/DL
POTASSIUM SERPL-SCNC: 4.2 MMOL/L (ref 3.5–5.1)
PROT SERPL-MCNC: 6.6 G/DL (ref 6–8.4)
SODIUM SERPL-SCNC: 141 MMOL/L (ref 136–145)
TRIGL SERPL-MCNC: 242 MG/DL (ref 30–150)

## 2024-04-19 PROCEDURE — 36415 COLL VENOUS BLD VENIPUNCTURE: CPT | Mod: PO | Performed by: FAMILY MEDICINE

## 2024-04-19 PROCEDURE — 80061 LIPID PANEL: CPT | Performed by: FAMILY MEDICINE

## 2024-04-19 PROCEDURE — 80053 COMPREHEN METABOLIC PANEL: CPT | Performed by: FAMILY MEDICINE

## 2024-04-23 ENCOUNTER — PATIENT MESSAGE (OUTPATIENT)
Dept: FAMILY MEDICINE | Facility: CLINIC | Age: 64
End: 2024-04-23
Payer: COMMERCIAL

## 2024-04-23 DIAGNOSIS — I10 ESSENTIAL HYPERTENSION: ICD-10-CM

## 2024-04-23 DIAGNOSIS — Z87.898 HISTORY OF PREDIABETES: Primary | ICD-10-CM

## 2024-04-23 DIAGNOSIS — E78.5 HYPERLIPIDEMIA, UNSPECIFIED HYPERLIPIDEMIA TYPE: ICD-10-CM

## 2024-04-23 RX ORDER — PRAVASTATIN SODIUM 20 MG/1
20 TABLET ORAL DAILY
Qty: 30 TABLET | Refills: 11 | Status: SHIPPED | OUTPATIENT
Start: 2024-04-23 | End: 2025-04-23

## 2024-04-23 NOTE — TELEPHONE ENCOUNTER
Patient was informed of result(s) via [facility]. Please contact them to schedule fasting labs and a follow up in 3 mo.

## 2024-04-29 ENCOUNTER — DOCUMENTATION ONLY (OUTPATIENT)
Dept: BARIATRICS | Facility: CLINIC | Age: 64
End: 2024-04-29
Payer: COMMERCIAL

## 2024-05-03 ENCOUNTER — OFFICE VISIT (OUTPATIENT)
Dept: FAMILY MEDICINE | Facility: CLINIC | Age: 64
End: 2024-05-03
Payer: COMMERCIAL

## 2024-05-03 DIAGNOSIS — E66.01 CLASS 3 SEVERE OBESITY DUE TO EXCESS CALORIES WITH SERIOUS COMORBIDITY AND BODY MASS INDEX (BMI) OF 40.0 TO 44.9 IN ADULT: Primary | ICD-10-CM

## 2024-05-03 DIAGNOSIS — E78.5 HYPERLIPIDEMIA, UNSPECIFIED HYPERLIPIDEMIA TYPE: ICD-10-CM

## 2024-05-03 PROCEDURE — 4010F ACE/ARB THERAPY RXD/TAKEN: CPT | Mod: CPTII,95,, | Performed by: FAMILY MEDICINE

## 2024-05-03 PROCEDURE — 3044F HG A1C LEVEL LT 7.0%: CPT | Mod: CPTII,95,, | Performed by: FAMILY MEDICINE

## 2024-05-03 PROCEDURE — 99214 OFFICE O/P EST MOD 30 MIN: CPT | Mod: 95,,, | Performed by: FAMILY MEDICINE

## 2024-05-03 PROCEDURE — 1159F MED LIST DOCD IN RCRD: CPT | Mod: CPTII,95,, | Performed by: FAMILY MEDICINE

## 2024-05-03 PROCEDURE — 1160F RVW MEDS BY RX/DR IN RCRD: CPT | Mod: CPTII,95,, | Performed by: FAMILY MEDICINE

## 2024-05-03 NOTE — PROGRESS NOTES
Assessment & Plan:    Class 3 severe obesity due to excess calories with serious comorbidity and body mass index (BMI) of 40.0 to 44.9 in adult  -     semaglutide, weight loss, 0.25 mg/0.5 mL PnIj; Inject 0.25 mg into the skin every 7 days.  Dispense: 0.5 mL; Refill: 11    Discussed GLP-1 agonists as an adjunct for weight loss. Encouraged consistency with dieting and exercising, including weight-bearing/resistance work outs. She was informed of the benefit and potential side effects of this medication. We will start Wegovy 0.25 mg weekly and increase dose as needed and if tolerating well.     Hyperlipidemia, unspecified hyperlipidemia type  Labs reviewed. Continue with pravastatin 20 mg.      The patient location is:  Patient Home   The chief complaint leading to consultation is: noted below  Visit type: Virtual visit with synchronous audio and video  Total time spent with patient: 10 minutes  Each patient to whom he or she provides medical services by telemedicine is:  (1) informed of the relationship between the physician and patient and the respective role of any other health care provider with respect to management of the patient; and (2) notified that he or she may decline to receive medical services by telemedicine and may withdraw from such care at any time.    Follow-up: already has repeat labs and follow up in July.     ______________________________________________________________________    Chief Complaint  Chief Complaint   Patient presents with    Hyperlipidemia       HPI  Violetta Coe is a 63 y.o. female with multiple medical diagnoses as listed in the medical history and problem list that presents in a virtual visit to discuss her most recent labs done on 4/19. Pt is known to me with her last appointment 1/22/2024. Patient's lipid panel shows improvement in her TC and LDL but triglycerides remain elevated. Complaint with pravastatin 10 mg and tolerating it well. Pravastatin was increased to 20 mg.      Patient is established with Bariatrics and was deemed a candidate for weight loss surgery. However, she would like to discuss weight loss medication first.         PAST MEDICAL HISTORY:  Past Medical History:   Diagnosis Date    GERD (gastroesophageal reflux disease)     Hx of colonic polyp 01/27/2020    Hyperlipidemia     Hypertension     Obesity, unspecified        PAST SURGICAL HISTORY:  Past Surgical History:   Procedure Laterality Date    BREAST CYST ASPIRATION      BREAST LUMPECTOMY Right     approx age 26yrs    CATARACT EXTRACTION      CHOLECYSTECTOMY      COLONOSCOPY W/ POLYPECTOMY  01/27/2020    GALLBLADDER SURGERY      HYSTERECTOMY  12/01/2013    Endometrial Cancer       SOCIAL HISTORY:  Social History     Socioeconomic History    Marital status: Unknown   Tobacco Use    Smoking status: Never     Passive exposure: Never    Smokeless tobacco: Never   Substance and Sexual Activity    Alcohol use: Never    Drug use: Never     Social Determinants of Health     Financial Resource Strain: Low Risk  (12/13/2023)    Overall Financial Resource Strain (CARDIA)     Difficulty of Paying Living Expenses: Not hard at all   Food Insecurity: No Food Insecurity (12/13/2023)    Hunger Vital Sign     Worried About Running Out of Food in the Last Year: Never true     Ran Out of Food in the Last Year: Never true   Transportation Needs: No Transportation Needs (12/13/2023)    PRAPARE - Transportation     Lack of Transportation (Medical): No     Lack of Transportation (Non-Medical): No   Physical Activity: Insufficiently Active (12/13/2023)    Exercise Vital Sign     Days of Exercise per Week: 5 days     Minutes of Exercise per Session: 20 min   Stress: No Stress Concern Present (12/13/2023)    [employer] - Occupational Stress Questionnaire     Feeling of Stress : Only a little   Housing Stability: Low Risk  (12/13/2023)    Housing Stability Vital Sign     Unable to Pay for Housing in the Last  Year: No     Number of Places Lived in the Last Year: 2     Unstable Housing in the Last Year: No       FAMILY HISTORY:  Family History   Problem Relation Name Age of Onset    Hypertension Mother Radha     Diabetes Mother Radha     Stroke Mother Radha     Breast cancer Mother Radha 46        UL mastectomy    Hypertension Father Lee     Hypothyroidism Father Lee     Prostate cancer Father Lee 66    Pancreatic cancer Maternal Aunt Felicita 80 - 89    Breast cancer Maternal Aunt Felicita 70 - 79    Breast cancer Maternal Aunt Markie 80        UL mastectomy    Hypertension Brother Lee Jr     Anuerysm Maternal Grandfather      Heart disease Maternal Grandmother      Prostate cancer Paternal Grandfather  78        metastatic to brain    Leukemia Paternal Grandmother  70 - 79    Leukemia Maternal Uncle Prieto 80 - 89    Leukemia Maternal Uncle Tino 80 - 89    Cancer Maternal Cousin          type uk?    Leukemia Paternal Uncle         ALLERGIES AND MEDICATIONS: updated and reviewed.  Review of patient's allergies indicates:  No Known Allergies  Current Outpatient Medications   Medication Sig Dispense Refill    CALCIUM ORAL Take by mouth.      cholecalciferol, vitamin D3, 1,250 mcg (50,000 unit) capsule Take 1 tablet by mouth.      FLOWFLEX COVID-19 AG HOME TEST Kit       lisinopriL-hydrochlorothiazide (PRINZIDE,ZESTORETIC) 20-12.5 mg per tablet Take 1 tablet by mouth once daily. 90 tablet 3    MAGNESIUM OXIDE ORAL Take 1 tablet by mouth once daily.      multivitamin with minerals tablet Take 1 tablet by mouth once daily.      nystatin (MYCOSTATIN) powder Apply topically.      omeprazole (PRILOSEC) 20 MG capsule Take 20 mg by mouth.      pravastatin (PRAVACHOL) 20 MG tablet Take 1 tablet (20 mg total) by mouth once daily. 30 tablet 11    semaglutide, weight loss, 0.25 mg/0.5 mL PnIj Inject 0.25 mg into the skin every 7 days. 0.5 mL 11    VITAMIN B COMPLEX ORAL Take 1 tablet by mouth once daily.       No current  facility-administered medications for this visit.         ROS  Review of Systems   Constitutional:  Negative for activity change and unexpected weight change.   HENT:  Negative for hearing loss, rhinorrhea and trouble swallowing.    Eyes:  Negative for discharge and visual disturbance.   Respiratory:  Negative for chest tightness and wheezing.    Cardiovascular:  Negative for chest pain and palpitations.   Gastrointestinal:  Positive for diarrhea. Negative for blood in stool, constipation and vomiting.   Endocrine: Negative for polydipsia and polyuria.   Genitourinary:  Negative for difficulty urinating, dysuria, hematuria and menstrual problem.   Musculoskeletal:  Negative for arthralgias, joint swelling and neck pain.   Neurological:  Negative for weakness and headaches.   Psychiatric/Behavioral:  Negative for confusion and dysphoric mood.            Physical Exam  Physical Exam  Constitutional:       General: She is not in acute distress.  HENT:      Head: Normocephalic and atraumatic.   Neurological:      Mental Status: She is alert. Mental status is at baseline.   Psychiatric:         Mood and Affect: Mood normal.         Behavior: Behavior normal.       BMI: 40.91 kg/m2    *Physical exam limited by virtual visit.    Health Maintenance         Date Due Completion Date    RSV Vaccine (Age 60+ and Pregnant patients) (1 - 1-dose 60+ series) Never done ---    COVID-19 Vaccine (4 - 2023-24 season) 09/01/2023 11/18/2022    Hemoglobin A1c (Prediabetes) 01/19/2025 1/19/2024    Colorectal Cancer Screening 01/27/2025 1/27/2020    Mammogram 02/02/2025 2/2/2024    Lipid Panel 04/19/2029 4/19/2024    TETANUS VACCINE 08/18/2030 8/18/2020

## 2024-05-10 ENCOUNTER — PATIENT MESSAGE (OUTPATIENT)
Dept: FAMILY MEDICINE | Facility: CLINIC | Age: 64
End: 2024-05-10
Payer: COMMERCIAL

## 2024-05-14 ENCOUNTER — PATIENT MESSAGE (OUTPATIENT)
Dept: BARIATRICS | Facility: CLINIC | Age: 64
End: 2024-05-14
Payer: COMMERCIAL

## 2024-05-17 ENCOUNTER — PATIENT MESSAGE (OUTPATIENT)
Dept: GENETICS | Facility: CLINIC | Age: 64
End: 2024-05-17
Payer: COMMERCIAL

## 2024-05-27 LAB
GENETIC COUNSELING?: YES
GENSO SPECIMEN TYPE: NORMAL
MISCELLANEOUS GENETIC TEST NAME: NORMAL
PARTENTAL OR SIBLING TESTING?: NO
REFERENCE LAB: NORMAL
TEST RESULT: NORMAL

## 2024-06-11 ENCOUNTER — PATIENT MESSAGE (OUTPATIENT)
Dept: BARIATRICS | Facility: CLINIC | Age: 64
End: 2024-06-11
Payer: COMMERCIAL

## 2024-07-03 ENCOUNTER — PATIENT MESSAGE (OUTPATIENT)
Dept: BARIATRICS | Facility: CLINIC | Age: 64
End: 2024-07-03
Payer: COMMERCIAL

## 2024-07-09 ENCOUNTER — PATIENT MESSAGE (OUTPATIENT)
Dept: BARIATRICS | Facility: CLINIC | Age: 64
End: 2024-07-09
Payer: COMMERCIAL

## 2024-07-15 ENCOUNTER — PATIENT MESSAGE (OUTPATIENT)
Dept: FAMILY MEDICINE | Facility: CLINIC | Age: 64
End: 2024-07-15
Payer: COMMERCIAL

## 2024-07-15 NOTE — TELEPHONE ENCOUNTER
Spoke with  Saravanan and scheduled her surgery consultation appointments with [facility] and [facility].  
of Systems   Constitutional:  Negative for appetite change, chills and fever.   Eyes:  Negative for redness and visual disturbance.   Respiratory:  Negative for cough, shortness of breath and wheezing.    Cardiovascular:  Negative for chest pain and leg swelling.   Gastrointestinal:  Negative for abdominal pain, constipation, nausea and vomiting.   Genitourinary:  Negative for decreased urine volume, difficulty urinating, dysuria, enuresis, flank pain, frequency, hematuria, penile discharge, penile pain, scrotal swelling, testicular pain and urgency.   Musculoskeletal:  Negative for back pain, joint swelling and myalgias.   Skin:  Negative for color change, rash and wound.   Neurological:  Negative for dizziness, tremors and numbness.   Hematological:  Negative for adenopathy. Does not bruise/bleed easily.       BP (!) 145/105 (Site: Left Upper Arm, Position: Sitting, Cuff Size: Large Adult)   Temp 97.5 °F (36.4 °C)   Ht 1.753 m (5' 9\")   Wt 113.4 kg (250 lb)   BMI 36.92 kg/m²       PHYSICAL EXAM:  Constitutional: Patient in no acute distress;   Neuro: alert and oriented to person place and time.    Psych: Mood and affect normal.  Skin: Normal  Lungs: Respiratory effort normal  Cardiovascular:  Normal peripheral pulses  Abdomen: Soft, non-tender, non-distended with no CVA, flank pain  Bladder non-tender and not distended.  Lymphatics: no palpable lymphadenopathy  Penis normal  Urethral meatus normal  Scrotal exam normal  Testicles normal bilaterally  Epididymis normal bilaterally  No evidence of inguinal hernia      Lab Results   Component Value Date    BUN 17 07/19/2023     Lab Results   Component Value Date    CREATININE 0.8 07/19/2023     No results found for: \"PSA\"    ASSESSMENT:  This is a 35 y.o. 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  He will follow-up afterwards

## 2024-07-19 ENCOUNTER — LAB VISIT (OUTPATIENT)
Dept: LAB | Facility: HOSPITAL | Age: 64
End: 2024-07-19
Attending: FAMILY MEDICINE
Payer: COMMERCIAL

## 2024-07-19 DIAGNOSIS — I10 ESSENTIAL HYPERTENSION: ICD-10-CM

## 2024-07-19 DIAGNOSIS — Z87.898 HISTORY OF PREDIABETES: ICD-10-CM

## 2024-07-19 DIAGNOSIS — E78.5 HYPERLIPIDEMIA, UNSPECIFIED HYPERLIPIDEMIA TYPE: ICD-10-CM

## 2024-07-19 LAB
ALBUMIN SERPL BCP-MCNC: 4 G/DL (ref 3.5–5.2)
ALP SERPL-CCNC: 87 U/L (ref 55–135)
ALT SERPL W/O P-5'-P-CCNC: 15 U/L (ref 10–44)
ANION GAP SERPL CALC-SCNC: 10 MMOL/L (ref 8–16)
AST SERPL-CCNC: 17 U/L (ref 10–40)
BASOPHILS # BLD AUTO: 0.05 K/UL (ref 0–0.2)
BASOPHILS NFR BLD: 0.7 % (ref 0–1.9)
BILIRUB SERPL-MCNC: 0.5 MG/DL (ref 0.1–1)
BUN SERPL-MCNC: 13 MG/DL (ref 8–23)
CALCIUM SERPL-MCNC: 9.7 MG/DL (ref 8.7–10.5)
CHLORIDE SERPL-SCNC: 106 MMOL/L (ref 95–110)
CHOLEST SERPL-MCNC: 171 MG/DL (ref 120–199)
CHOLEST/HDLC SERPL: 5 {RATIO} (ref 2–5)
CO2 SERPL-SCNC: 24 MMOL/L (ref 23–29)
CREAT SERPL-MCNC: 0.8 MG/DL (ref 0.5–1.4)
DIFFERENTIAL METHOD BLD: NORMAL
EOSINOPHIL # BLD AUTO: 0.1 K/UL (ref 0–0.5)
EOSINOPHIL NFR BLD: 1.9 % (ref 0–8)
ERYTHROCYTE [DISTWIDTH] IN BLOOD BY AUTOMATED COUNT: 13.3 % (ref 11.5–14.5)
EST. GFR  (NO RACE VARIABLE): >60 ML/MIN/1.73 M^2
ESTIMATED AVG GLUCOSE: 111 MG/DL (ref 68–131)
GLUCOSE SERPL-MCNC: 120 MG/DL (ref 70–110)
HBA1C MFR BLD: 5.5 % (ref 4–5.6)
HCT VFR BLD AUTO: 45.7 % (ref 37–48.5)
HDLC SERPL-MCNC: 34 MG/DL (ref 40–75)
HDLC SERPL: 19.9 % (ref 20–50)
HGB BLD-MCNC: 14.7 G/DL (ref 12–16)
IMM GRANULOCYTES # BLD AUTO: 0.02 K/UL (ref 0–0.04)
IMM GRANULOCYTES NFR BLD AUTO: 0.3 % (ref 0–0.5)
LDLC SERPL CALC-MCNC: 93.2 MG/DL (ref 63–159)
LYMPHOCYTES # BLD AUTO: 2.6 K/UL (ref 1–4.8)
LYMPHOCYTES NFR BLD: 38.5 % (ref 18–48)
MCH RBC QN AUTO: 27.6 PG (ref 27–31)
MCHC RBC AUTO-ENTMCNC: 32.2 G/DL (ref 32–36)
MCV RBC AUTO: 86 FL (ref 82–98)
MONOCYTES # BLD AUTO: 0.5 K/UL (ref 0.3–1)
MONOCYTES NFR BLD: 7.6 % (ref 4–15)
NEUTROPHILS # BLD AUTO: 3.4 K/UL (ref 1.8–7.7)
NEUTROPHILS NFR BLD: 51 % (ref 38–73)
NONHDLC SERPL-MCNC: 137 MG/DL
NRBC BLD-RTO: 0 /100 WBC
PLATELET # BLD AUTO: 257 K/UL (ref 150–450)
PMV BLD AUTO: 11.6 FL (ref 9.2–12.9)
POTASSIUM SERPL-SCNC: 3.9 MMOL/L (ref 3.5–5.1)
PROT SERPL-MCNC: 7.1 G/DL (ref 6–8.4)
RBC # BLD AUTO: 5.33 M/UL (ref 4–5.4)
SODIUM SERPL-SCNC: 140 MMOL/L (ref 136–145)
TRIGL SERPL-MCNC: 219 MG/DL (ref 30–150)
WBC # BLD AUTO: 6.67 K/UL (ref 3.9–12.7)

## 2024-07-19 PROCEDURE — 80061 LIPID PANEL: CPT | Performed by: FAMILY MEDICINE

## 2024-07-19 PROCEDURE — 36415 COLL VENOUS BLD VENIPUNCTURE: CPT | Mod: PO | Performed by: FAMILY MEDICINE

## 2024-07-19 PROCEDURE — 85025 COMPLETE CBC W/AUTO DIFF WBC: CPT | Performed by: FAMILY MEDICINE

## 2024-07-19 PROCEDURE — 80053 COMPREHEN METABOLIC PANEL: CPT | Performed by: FAMILY MEDICINE

## 2024-07-19 PROCEDURE — 83036 HEMOGLOBIN GLYCOSYLATED A1C: CPT | Performed by: FAMILY MEDICINE

## 2024-07-23 ENCOUNTER — PATIENT MESSAGE (OUTPATIENT)
Dept: FAMILY MEDICINE | Facility: CLINIC | Age: 64
End: 2024-07-23
Payer: COMMERCIAL

## 2024-07-30 ENCOUNTER — OFFICE VISIT (OUTPATIENT)
Dept: FAMILY MEDICINE | Facility: CLINIC | Age: 64
End: 2024-07-30
Payer: COMMERCIAL

## 2024-07-30 VITALS
TEMPERATURE: 98 F | WEIGHT: 209.13 LBS | OXYGEN SATURATION: 97 % | HEART RATE: 69 BPM | DIASTOLIC BLOOD PRESSURE: 68 MMHG | SYSTOLIC BLOOD PRESSURE: 126 MMHG | BODY MASS INDEX: 38.25 KG/M2

## 2024-07-30 DIAGNOSIS — Z91.89 AT HIGH RISK FOR BREAST CANCER: ICD-10-CM

## 2024-07-30 DIAGNOSIS — E78.5 HYPERLIPIDEMIA, UNSPECIFIED HYPERLIPIDEMIA TYPE: ICD-10-CM

## 2024-07-30 DIAGNOSIS — I10 ESSENTIAL HYPERTENSION: ICD-10-CM

## 2024-07-30 DIAGNOSIS — E66.01 CLASS 2 SEVERE OBESITY DUE TO EXCESS CALORIES WITH SERIOUS COMORBIDITY AND BODY MASS INDEX (BMI) OF 38.0 TO 38.9 IN ADULT: Primary | ICD-10-CM

## 2024-07-30 DIAGNOSIS — Z87.898 HISTORY OF PREDIABETES: ICD-10-CM

## 2024-07-30 PROCEDURE — 99214 OFFICE O/P EST MOD 30 MIN: CPT | Mod: S$GLB,,, | Performed by: FAMILY MEDICINE

## 2024-07-30 PROCEDURE — 1160F RVW MEDS BY RX/DR IN RCRD: CPT | Mod: CPTII,S$GLB,, | Performed by: FAMILY MEDICINE

## 2024-07-30 PROCEDURE — 4010F ACE/ARB THERAPY RXD/TAKEN: CPT | Mod: CPTII,S$GLB,, | Performed by: FAMILY MEDICINE

## 2024-07-30 PROCEDURE — 99999 PR PBB SHADOW E&M-EST. PATIENT-LVL IV: CPT | Mod: PBBFAC,,, | Performed by: FAMILY MEDICINE

## 2024-07-30 PROCEDURE — 1159F MED LIST DOCD IN RCRD: CPT | Mod: CPTII,S$GLB,, | Performed by: FAMILY MEDICINE

## 2024-07-30 PROCEDURE — 3044F HG A1C LEVEL LT 7.0%: CPT | Mod: CPTII,S$GLB,, | Performed by: FAMILY MEDICINE

## 2024-07-30 PROCEDURE — 3008F BODY MASS INDEX DOCD: CPT | Mod: CPTII,S$GLB,, | Performed by: FAMILY MEDICINE

## 2024-07-30 PROCEDURE — 3074F SYST BP LT 130 MM HG: CPT | Mod: CPTII,S$GLB,, | Performed by: FAMILY MEDICINE

## 2024-07-30 PROCEDURE — 3078F DIAST BP <80 MM HG: CPT | Mod: CPTII,S$GLB,, | Performed by: FAMILY MEDICINE

## 2024-07-30 NOTE — PROGRESS NOTES
Assessment & Plan:    Class 2 severe obesity due to excess calories with serious comorbidity and body mass index (BMI) of 38.0 to 38.9 in adult   Commended patient on her healthier dietary changes leading to weight loss.    History of prediabetes  -     Hemoglobin A1C; Future; Expected date: 07/30/2024    A1c in normal range. Monitor q6 months.    Hyperlipidemia, unspecified hyperlipidemia type  -     Lipid Panel; Future; Expected date: 07/30/2024    Triglycerides remain elevated. Patient would rather get it under control with her diet alone.  Repeat labs in 6 mo.    Essential hypertension  -     CBC Auto Differential; Future; Expected date: 07/30/2024  -     Comprehensive Metabolic Panel; Future; Expected date: 07/30/2024  -     Hemoglobin A1C; Future; Expected date: 07/30/2024  -     Lipid Panel; Future; Expected date: 07/30/2024    Controlled. Continue current therapy.     At high risk for breast cancer  -     MRI Breast w/o Contrast, Bilateral; Future; Expected date: 08/30/2024    Due for MRI in August.       Health maintenance reviewed & addressed above.    Encouraged RSV vaccine.     Follow-up: Follow up in about 6 months (around 1/30/2025).  ______________________________________________________________________    Chief Complaint  Chief Complaint   Patient presents with    Health Maintenance              HPI  Violetta Coe is a 63 y.o. female with medical diagnoses as listed in the medical history and problem list that presents to the office to follow up on her chronic conditions. Patient was prescribed semaglutide for weight loss but never started taking it. She has lost 15 lbs since her last visit with intermittent fasting. She has a personal history of endometrial cancer and family history of breast, prostate, and pancreatic cancer, so she saw a  for genetic testing. Her re-calculated Tyrer-Cuzick risk was calculated to be 23.51% and it was recommended that she have an annual breast MRI in addition  to her yearly mammogram for further breast cancer screening.     Most recent pertinent workup:     Last CBC Results:   Lab Results   Component Value Date    WBC 6.67 07/19/2024    HGB 14.7 07/19/2024    HCT 45.7 07/19/2024     07/19/2024       Last CMP Results  Lab Results   Component Value Date     07/19/2024    K 3.9 07/19/2024     07/19/2024    CO2 24 07/19/2024    BUN 13 07/19/2024    CREATININE 0.8 07/19/2024    CALCIUM 9.7 07/19/2024    ALBUMIN 4.0 07/19/2024    AST 17 07/19/2024    ALT 15 07/19/2024       Last Lipids  Lab Results   Component Value Date    CHOL 171 07/19/2024    TRIG 219 (H) 07/19/2024    HDL 34 (L) 07/19/2024    LDLCALC 93.2 07/19/2024       Last A1C  Lab Results   Component Value Date    HGBA1C 5.5 07/19/2024         Health Maintenance         Date Due Completion Date    RSV Vaccine (Age 60+ and Pregnant patients) (1 - 1-dose 60+ series) Never done ---    COVID-19 Vaccine (4 - 2023-24 season) 09/01/2023 11/18/2022    Influenza Vaccine (1) 09/01/2024 1/22/2024    Colorectal Cancer Screening 01/27/2025 1/27/2020    Mammogram 02/02/2025 2/2/2024    Hemoglobin A1c (Prediabetes) 07/19/2025 7/19/2024    Lipid Panel 07/19/2029 7/19/2024    TETANUS VACCINE 08/18/2030 8/18/2020              PAST MEDICAL HISTORY:  Past Medical History:   Diagnosis Date    Genetic testing 04/02/2024    [place] BRCA1/2 Analyses with CancerNext-Expanded +RNAinsight (77 genes) - NEGATIVE    GERD (gastroesophageal reflux disease)     Hx of colonic polyp 01/27/2020    Hyperlipidemia     Hypertension     Obesity, unspecified        PAST SURGICAL HISTORY:  Past Surgical History:   Procedure Laterality Date    BREAST CYST ASPIRATION      BREAST LUMPECTOMY Right     approx age 26yrs    CATARACT EXTRACTION      CHOLECYSTECTOMY      COLONOSCOPY W/ POLYPECTOMY  01/27/2020    GALLBLADDER SURGERY      HYSTERECTOMY  12/01/2013    Endometrial Cancer       SOCIAL HISTORY:  Social History     Socioeconomic History     Marital status: Unknown   Tobacco Use    Smoking status: Never     Passive exposure: Never    Smokeless tobacco: Never   Substance and Sexual Activity    Alcohol use: Never    Drug use: Never     Social Determinants of Health     Financial Resource Strain: Low Risk  (12/13/2023)    Overall Financial Resource Strain (CARDIA)     Difficulty of Paying Living Expenses: Not hard at all   Food Insecurity: No Food Insecurity (12/13/2023)    Hunger Vital Sign     Worried About Running Out of Food in the Last Year: Never true     Ran Out of Food in the Last Year: Never true   Transportation Needs: No Transportation Needs (12/13/2023)    PRAPARE - Transportation     Lack of Transportation (Medical): No     Lack of Transportation (Non-Medical): No   Physical Activity: Insufficiently Active (12/13/2023)    Exercise Vital Sign     Days of Exercise per Week: 5 days     Minutes of Exercise per Session: 20 min   Stress: No Stress Concern Present (12/13/2023)    [employer] - Occupational Stress Questionnaire     Feeling of Stress : Only a little   Housing Stability: Low Risk  (12/13/2023)    Housing Stability Vital Sign     Unable to Pay for Housing in the Last Year: No     Number of Places Lived in the Last Year: 2     Unstable Housing in the Last Year: No       FAMILY HISTORY:  Family History   Problem Relation Name Age of Onset    Hypertension Mother Radha     Diabetes Mother Radha     Stroke Mother Radha     Breast cancer Mother Radha 46        UL mastectomy    Hypertension Father Lee     Hypothyroidism Father Lee     Prostate cancer Father Lee 66    Pancreatic cancer Maternal Aunt Felicita 80 - 89    Breast cancer Maternal Aunt Felicita 70 - 79    Breast cancer Maternal Aunt Markie 80        UL mastectomy    Hypertension Brother Lee Jr     Anuerysm Maternal Grandfather      Heart disease Maternal Grandmother      Prostate cancer Paternal Grandfather  78        metastatic to brain    Leukemia  Paternal Grandmother  70 - 79    Leukemia Maternal Uncle Prieto 80 - 89    Leukemia Maternal Uncle Tino 80 - 89    Cancer Maternal Cousin          type uk?    Leukemia Paternal Uncle         ALLERGIES AND MEDICATIONS: updated and reviewed.  Review of patient's allergies indicates:  No Known Allergies  Current Outpatient Medications   Medication Sig Dispense Refill    CALCIUM ORAL Take by mouth.      cholecalciferol, vitamin D3, 1,250 mcg (50,000 unit) capsule Take 1 tablet by mouth.      lisinopriL-hydrochlorothiazide (PRINZIDE,ZESTORETIC) 20-12.5 mg per tablet Take 1 tablet by mouth once daily. 90 tablet 3    MAGNESIUM OXIDE ORAL Take 1 tablet by mouth once daily.      multivitamin with minerals tablet Take 1 tablet by mouth once daily.      omeprazole (PRILOSEC) 20 MG capsule Take 20 mg by mouth.      pravastatin (PRAVACHOL) 20 MG tablet Take 1 tablet (20 mg total) by mouth once daily. 30 tablet 11    VITAMIN B COMPLEX ORAL Take 1 tablet by mouth once daily.       No current facility-administered medications for this visit.         ROS  Review of Systems   Constitutional:  Negative for activity change and unexpected weight change.           Physical Exam  Vitals:    07/30/24 0850   BP: 126/68   Pulse: 69   Temp: 98.2 °F (36.8 °C)   TempSrc: Oral   SpO2: 97%   Weight: 94.8 kg (209 lb 1.7 oz)    Body mass index is 38.25 kg/m².  Weight: 94.8 kg (209 lb 1.7 oz)       Physical Exam  Constitutional:       General: She is not in acute distress.     Appearance: She is obese.   HENT:      Head: Normocephalic and atraumatic.   Neck:      Thyroid: No thyromegaly.      Vascular: No carotid bruit.   Cardiovascular:      Rate and Rhythm: Normal rate and regular rhythm.      Pulses: Normal pulses.      Heart sounds: Normal heart sounds.   Pulmonary:      Effort: Pulmonary effort is normal. No respiratory distress.      Breath sounds: Normal breath sounds.   Musculoskeletal:      Cervical back: Neck supple.      Right lower leg: No  edema.      Left lower leg: No edema.   Lymphadenopathy:      Cervical: No cervical adenopathy.   Skin:     General: Skin is warm and dry.      Findings: No rash.   Neurological:      General: No focal deficit present.      Mental Status: She is alert and oriented to person, place, and time.   Psychiatric:         Mood and Affect: Mood normal.         Behavior: Behavior normal.         Thought Content: Thought content normal.

## 2024-08-12 ENCOUNTER — PATIENT MESSAGE (OUTPATIENT)
Dept: BARIATRICS | Facility: CLINIC | Age: 64
End: 2024-08-12
Payer: COMMERCIAL

## 2024-09-03 ENCOUNTER — PATIENT MESSAGE (OUTPATIENT)
Dept: BARIATRICS | Facility: CLINIC | Age: 64
End: 2024-09-03
Payer: COMMERCIAL

## 2024-09-10 ENCOUNTER — PATIENT MESSAGE (OUTPATIENT)
Dept: BARIATRICS | Facility: CLINIC | Age: 64
End: 2024-09-10
Payer: COMMERCIAL

## 2024-09-13 RX ORDER — LISINOPRIL AND HYDROCHLOROTHIAZIDE 12.5; 2 MG/1; MG/1
1 TABLET ORAL
Qty: 90 TABLET | Refills: 3 | Status: SHIPPED | OUTPATIENT
Start: 2024-09-13

## 2024-09-13 NOTE — TELEPHONE ENCOUNTER
No care due was identified.  [facility] Embedded Care Due Messages. Reference number: [id number].   9/13/2024 7:57:54 AM CDT

## 2024-09-13 NOTE — TELEPHONE ENCOUNTER
Refill Decision Note   Violetta Coe  is requesting a refill authorization.  Brief Assessment and Rationale for Refill:  Approve     Medication Therapy Plan:        Comments:     Note composed:1:37 PM 09/13/2024

## 2024-09-21 ENCOUNTER — TELEPHONE (OUTPATIENT)
Dept: BARIATRICS | Facility: CLINIC | Age: 64
End: 2024-09-21
Payer: COMMERCIAL

## 2024-09-23 ENCOUNTER — DOCUMENTATION ONLY (OUTPATIENT)
Dept: BARIATRICS | Facility: CLINIC | Age: 64
End: 2024-09-23
Payer: COMMERCIAL

## 2024-09-23 NOTE — PROGRESS NOTES
Pt is no longer interested in bariatric surgery.  Snapshot and Bariatric Navigator updated.  Inez Barrow RN updated in TEAMs message.

## 2024-09-30 ENCOUNTER — HOSPITAL ENCOUNTER (OUTPATIENT)
Dept: RADIOLOGY | Facility: HOSPITAL | Age: 64
Discharge: HOME | End: 2024-09-30
Attending: FAMILY MEDICINE
Payer: COMMERCIAL

## 2024-09-30 DIAGNOSIS — Z91.89 AT HIGH RISK FOR BREAST CANCER: ICD-10-CM

## 2024-09-30 PROCEDURE — 77049 MRI BREAST C-+ W/CAD BI: CPT | Mod: TC

## 2024-09-30 PROCEDURE — 25500020 PHARM REV CODE 255: Performed by: FAMILY MEDICINE

## 2024-09-30 PROCEDURE — 77049 MRI BREAST C-+ W/CAD BI: CPT | Mod: 26,,, | Performed by: RADIOLOGY

## 2024-09-30 PROCEDURE — A9577 INJ MULTIHANCE: HCPCS | Performed by: FAMILY MEDICINE

## 2024-09-30 RX ADMIN — GADOBENATE DIMEGLUMINE 20 ML: 529 INJECTION, SOLUTION INTRAVENOUS at 05:09

## 2024-10-01 ENCOUNTER — PATIENT MESSAGE (OUTPATIENT)
Dept: FAMILY MEDICINE | Facility: CLINIC | Age: 64
End: 2024-10-01
Payer: COMMERCIAL

## 2024-10-01 DIAGNOSIS — Z91.89 AT HIGH RISK FOR BREAST CANCER: ICD-10-CM

## 2024-10-01 DIAGNOSIS — Z12.31 ENCOUNTER FOR SCREENING MAMMOGRAM FOR MALIGNANT NEOPLASM OF BREAST: Primary | ICD-10-CM

## 2024-10-01 NOTE — TELEPHONE ENCOUNTER
Patient was informed of result(s) via [facility]. Please contact them to schedule a mammo in Jan or Feb.

## 2024-10-28 ENCOUNTER — PATIENT MESSAGE (OUTPATIENT)
Dept: FAMILY MEDICINE | Facility: CLINIC | Age: 64
End: 2024-10-28
Payer: COMMERCIAL

## 2024-10-28 DIAGNOSIS — I10 ESSENTIAL HYPERTENSION: Primary | ICD-10-CM

## 2024-10-28 RX ORDER — LISINOPRIL 40 MG/1
40 TABLET ORAL DAILY
Qty: 30 TABLET | Refills: 11 | Status: SHIPPED | OUTPATIENT
Start: 2024-10-28 | End: 2025-10-28

## 2025-01-26 ENCOUNTER — E-VISIT (OUTPATIENT)
Dept: FAMILY MEDICINE | Facility: CLINIC | Age: 65
End: 2025-01-26
Payer: COMMERCIAL

## 2025-01-26 DIAGNOSIS — R39.89 SUSPECTED UTI: Primary | ICD-10-CM

## 2025-01-26 PROCEDURE — 99421 OL DIG E/M SVC 5-10 MIN: CPT | Mod: ,,, | Performed by: FAMILY MEDICINE

## 2025-01-27 ENCOUNTER — LAB VISIT (OUTPATIENT)
Dept: LAB | Facility: HOSPITAL | Age: 65
End: 2025-01-27
Attending: FAMILY MEDICINE
Payer: COMMERCIAL

## 2025-01-27 DIAGNOSIS — R39.89 SUSPECTED UTI: ICD-10-CM

## 2025-01-27 LAB
BACTERIA #/AREA URNS AUTO: ABNORMAL /HPF
BILIRUB UR QL STRIP: NEGATIVE
CLARITY UR REFRACT.AUTO: CLEAR
COLOR UR AUTO: YELLOW
GLUCOSE UR QL STRIP: NEGATIVE
HGB UR QL STRIP: NEGATIVE
KETONES UR QL STRIP: NEGATIVE
LEUKOCYTE ESTERASE UR QL STRIP: ABNORMAL
MICROSCOPIC COMMENT: ABNORMAL
NITRITE UR QL STRIP: NEGATIVE
PH UR STRIP: 7 [PH] (ref 5–8)
PROT UR QL STRIP: NEGATIVE
RBC #/AREA URNS AUTO: 6 /HPF (ref 0–4)
SP GR UR STRIP: 1.01 (ref 1–1.03)
SQUAMOUS #/AREA URNS AUTO: 0 /HPF
URN SPEC COLLECT METH UR: ABNORMAL
WBC #/AREA URNS AUTO: 19 /HPF (ref 0–5)
YEAST UR QL AUTO: ABNORMAL

## 2025-01-27 PROCEDURE — 87086 URINE CULTURE/COLONY COUNT: CPT | Performed by: FAMILY MEDICINE

## 2025-01-27 PROCEDURE — 81001 URINALYSIS AUTO W/SCOPE: CPT | Performed by: FAMILY MEDICINE

## 2025-01-27 RX ORDER — PHENAZOPYRIDINE HYDROCHLORIDE 200 MG/1
200 TABLET, FILM COATED ORAL 3 TIMES DAILY PRN
Qty: 9 TABLET | Refills: 0 | Status: SHIPPED | OUTPATIENT
Start: 2025-01-27 | End: 2025-01-30

## 2025-01-27 RX ORDER — CIPROFLOXACIN 250 MG/1
250 TABLET, FILM COATED ORAL 2 TIMES DAILY
Qty: 6 TABLET | Refills: 0 | Status: SHIPPED | OUTPATIENT
Start: 2025-01-27 | End: 2025-01-30

## 2025-01-27 NOTE — PROGRESS NOTES
Patient ID: Violetta Coe is a 64 y.o. female.    Chief Complaint: Dysuria    The patient initiated a request through [facility] on 1/26/2025 for evaluation and management with a chief complaint of Dysuria     I evaluated the questionnaire submission on 1/27/25.    [facility] Evisit [facility]group-Common Problems    1/26/2025 11:07 PM CST - Filed by Patient   What do you need help with? Urinary Symptoms   Do you agree to participate in an E-Visit? Yes   If you have any of the following symptoms, please present to your local emergency room or call 911:  I acknowledge   What is the main issue you would like addressed today? UTI   What symptoms do you currently have? Pain while passing urine;  Difficulty passing urine   When did your symptoms first appear? 1/26/2025   List what you have done or taken to help your symptoms. Water   Please indicate whether you have had the following symptoms during the past 24 hours     Urgent urination (a sudden and uncontrollable urge to urinate) Moderate   Frequent urination of small amounts of urine (going to the toilet very often) Moderate   Burning pain when urinating Severe   Incomplete bladder emptying (still feel like you need to urinate again after urination) Moderate   Pain not associated with urination in the lower abdomen below the belly button) Mild   What does your urine look like? I am not sure   Blood seen in the urine None   Flank pain (pain in one or both sides of the lower back) None   Abnormal Vaginal Discharge (abnormal amount, color and/or odor) None   Discharge from the urethra (urinary opening) without urination None   High body temperature/fever? None-<99.5   Please rate how much discomfort you have experience because of the symptoms in the past 24 hours: Moderate   Please indicate how the symptoms have interfered with your every day activities/work in the past 24 hours: Moderate   Please indicate how these symptoms have interfered with your social activities (visiting  people, meeting with friends, etc.) in the past 24 hours? Moderate   Are you a diabetic? No   Please indicate whether you have the following at the time of completion of this questionnaire: None of the above   Provide any additional information you feel is important.    Please attach any relevant images or files (if you have performed a home test for UTI, please submit a photo of results)    Are you able to take your vital signs? No         Encounter Diagnosis   Name Primary?    Suspected UTI Yes        Orders Placed This Encounter   Procedures    Urine Culture High Risk     Standing Status:   Future     Standing Expiration Date:   4/27/2026     Order Specific Question:   Send normal result to authorizing provider's In Basket if patient is active on MyChart:     Answer:   Yes    Urinalysis     Standing Status:   Future     Standing Expiration Date:   3/28/2026     Order Specific Question:   Collection Type     Answer:   Urine, Clean Catch      Medications Ordered This Encounter   Medications    ciprofloxacin HCl (CIPRO) 250 MG tablet     Sig: Take 1 tablet (250 mg total) by mouth 2 (two) times daily. for 3 days     Dispense:  6 tablet     Refill:  0    phenazopyridine (PYRIDIUM) 200 MG tablet     Sig: Take 1 tablet (200 mg total) by mouth 3 (three) times daily as needed for Pain.     Dispense:  9 tablet     Refill:  0      Please see [facility] message for the plan of care.       Follow up if symptoms worsen or fail to improve.      E-Visit Time Tracking:    Day 1 Time (in minutes): 5    Total Time (in minutes): 5

## 2025-01-28 ENCOUNTER — PATIENT MESSAGE (OUTPATIENT)
Dept: FAMILY MEDICINE | Facility: CLINIC | Age: 65
End: 2025-01-28
Payer: COMMERCIAL

## 2025-01-28 LAB
BACTERIA UR CULT: NORMAL
BACTERIA UR CULT: NORMAL

## 2025-01-30 ENCOUNTER — PATIENT OUTREACH (OUTPATIENT)
Dept: ADMINISTRATIVE | Facility: HOSPITAL | Age: 65
End: 2025-01-30
Payer: COMMERCIAL

## 2025-01-31 ENCOUNTER — LAB VISIT (OUTPATIENT)
Dept: LAB | Facility: HOSPITAL | Age: 65
End: 2025-01-31
Attending: FAMILY MEDICINE
Payer: COMMERCIAL

## 2025-01-31 DIAGNOSIS — I10 ESSENTIAL HYPERTENSION: ICD-10-CM

## 2025-01-31 DIAGNOSIS — Z87.898 HISTORY OF PREDIABETES: ICD-10-CM

## 2025-01-31 DIAGNOSIS — E78.5 HYPERLIPIDEMIA, UNSPECIFIED HYPERLIPIDEMIA TYPE: ICD-10-CM

## 2025-01-31 LAB
ALBUMIN SERPL BCP-MCNC: 3.6 G/DL (ref 3.5–5.2)
ALP SERPL-CCNC: 77 U/L (ref 40–150)
ALT SERPL W/O P-5'-P-CCNC: 17 U/L (ref 10–44)
ANION GAP SERPL CALC-SCNC: 7 MMOL/L (ref 8–16)
AST SERPL-CCNC: 18 U/L (ref 10–40)
BASOPHILS # BLD AUTO: 0.07 K/UL (ref 0–0.2)
BASOPHILS NFR BLD: 1.1 % (ref 0–1.9)
BILIRUB SERPL-MCNC: 0.3 MG/DL (ref 0.1–1)
BUN SERPL-MCNC: 17 MG/DL (ref 8–23)
CALCIUM SERPL-MCNC: 9.6 MG/DL (ref 8.7–10.5)
CHLORIDE SERPL-SCNC: 109 MMOL/L (ref 95–110)
CHOLEST SERPL-MCNC: 193 MG/DL (ref 120–199)
CHOLEST/HDLC SERPL: 5.7 {RATIO} (ref 2–5)
CO2 SERPL-SCNC: 22 MMOL/L (ref 23–29)
CREAT SERPL-MCNC: 0.6 MG/DL (ref 0.5–1.4)
DIFFERENTIAL METHOD BLD: NORMAL
EOSINOPHIL # BLD AUTO: 0.1 K/UL (ref 0–0.5)
EOSINOPHIL NFR BLD: 1.2 % (ref 0–8)
ERYTHROCYTE [DISTWIDTH] IN BLOOD BY AUTOMATED COUNT: 13.6 % (ref 11.5–14.5)
EST. GFR  (NO RACE VARIABLE): >60 ML/MIN/1.73 M^2
ESTIMATED AVG GLUCOSE: 103 MG/DL (ref 68–131)
GLUCOSE SERPL-MCNC: 108 MG/DL (ref 70–110)
HBA1C MFR BLD: 5.2 % (ref 4–5.6)
HCT VFR BLD AUTO: 42 % (ref 37–48.5)
HDLC SERPL-MCNC: 34 MG/DL (ref 40–75)
HDLC SERPL: 17.6 % (ref 20–50)
HGB BLD-MCNC: 13.7 G/DL (ref 12–16)
IMM GRANULOCYTES # BLD AUTO: 0.01 K/UL (ref 0–0.04)
IMM GRANULOCYTES NFR BLD AUTO: 0.2 % (ref 0–0.5)
LDLC SERPL CALC-MCNC: 127 MG/DL (ref 63–159)
LYMPHOCYTES # BLD AUTO: 2.3 K/UL (ref 1–4.8)
LYMPHOCYTES NFR BLD: 34.5 % (ref 18–48)
MCH RBC QN AUTO: 28.6 PG (ref 27–31)
MCHC RBC AUTO-ENTMCNC: 32.6 G/DL (ref 32–36)
MCV RBC AUTO: 88 FL (ref 82–98)
MONOCYTES # BLD AUTO: 0.5 K/UL (ref 0.3–1)
MONOCYTES NFR BLD: 7.8 % (ref 4–15)
NEUTROPHILS # BLD AUTO: 3.6 K/UL (ref 1.8–7.7)
NEUTROPHILS NFR BLD: 55.2 % (ref 38–73)
NONHDLC SERPL-MCNC: 159 MG/DL
NRBC BLD-RTO: 0 /100 WBC
PLATELET # BLD AUTO: 245 K/UL (ref 150–450)
PMV BLD AUTO: 11.4 FL (ref 9.2–12.9)
POTASSIUM SERPL-SCNC: 4.4 MMOL/L (ref 3.5–5.1)
PROT SERPL-MCNC: 6.6 G/DL (ref 6–8.4)
RBC # BLD AUTO: 4.79 M/UL (ref 4–5.4)
SODIUM SERPL-SCNC: 138 MMOL/L (ref 136–145)
TRIGL SERPL-MCNC: 160 MG/DL (ref 30–150)
WBC # BLD AUTO: 6.58 K/UL (ref 3.9–12.7)

## 2025-01-31 PROCEDURE — 36415 COLL VENOUS BLD VENIPUNCTURE: CPT | Mod: PO | Performed by: FAMILY MEDICINE

## 2025-01-31 PROCEDURE — 83036 HEMOGLOBIN GLYCOSYLATED A1C: CPT | Performed by: FAMILY MEDICINE

## 2025-01-31 PROCEDURE — 80061 LIPID PANEL: CPT | Performed by: FAMILY MEDICINE

## 2025-01-31 PROCEDURE — 80053 COMPREHEN METABOLIC PANEL: CPT | Performed by: FAMILY MEDICINE

## 2025-01-31 PROCEDURE — 85025 COMPLETE CBC W/AUTO DIFF WBC: CPT | Performed by: FAMILY MEDICINE

## 2025-02-07 ENCOUNTER — OFFICE VISIT (OUTPATIENT)
Dept: FAMILY MEDICINE | Facility: CLINIC | Age: 65
End: 2025-02-07
Payer: COMMERCIAL

## 2025-02-07 ENCOUNTER — HOSPITAL ENCOUNTER (OUTPATIENT)
Dept: RADIOLOGY | Facility: HOSPITAL | Age: 65
Discharge: HOME | End: 2025-02-07
Attending: FAMILY MEDICINE
Payer: COMMERCIAL

## 2025-02-07 VITALS
SYSTOLIC BLOOD PRESSURE: 130 MMHG | RESPIRATION RATE: 18 BRPM | WEIGHT: 170.94 LBS | DIASTOLIC BLOOD PRESSURE: 64 MMHG | HEART RATE: 75 BPM | OXYGEN SATURATION: 98 % | HEIGHT: 62 IN | BODY MASS INDEX: 31.46 KG/M2

## 2025-02-07 DIAGNOSIS — Z12.31 ENCOUNTER FOR SCREENING MAMMOGRAM FOR MALIGNANT NEOPLASM OF BREAST: ICD-10-CM

## 2025-02-07 DIAGNOSIS — Z23 COVID-19 VACCINE ADMINISTERED: ICD-10-CM

## 2025-02-07 DIAGNOSIS — E66.09 CLASS 1 OBESITY DUE TO EXCESS CALORIES WITH SERIOUS COMORBIDITY AND BODY MASS INDEX (BMI) OF 31.0 TO 31.9 IN ADULT: ICD-10-CM

## 2025-02-07 DIAGNOSIS — K21.9 GASTROESOPHAGEAL REFLUX DISEASE, UNSPECIFIED WHETHER ESOPHAGITIS PRESENT: ICD-10-CM

## 2025-02-07 DIAGNOSIS — Z23 INFLUENZA VACCINE NEEDED: ICD-10-CM

## 2025-02-07 DIAGNOSIS — I10 ESSENTIAL HYPERTENSION: Primary | ICD-10-CM

## 2025-02-07 DIAGNOSIS — Z12.11 SCREENING FOR COLON CANCER: ICD-10-CM

## 2025-02-07 DIAGNOSIS — E66.811 CLASS 1 OBESITY DUE TO EXCESS CALORIES WITH SERIOUS COMORBIDITY AND BODY MASS INDEX (BMI) OF 31.0 TO 31.9 IN ADULT: ICD-10-CM

## 2025-02-07 DIAGNOSIS — E78.5 HYPERLIPIDEMIA, UNSPECIFIED HYPERLIPIDEMIA TYPE: ICD-10-CM

## 2025-02-07 PROBLEM — E66.01 CLASS 3 SEVERE OBESITY DUE TO EXCESS CALORIES WITH SERIOUS COMORBIDITY AND BODY MASS INDEX (BMI) OF 40.0 TO 44.9 IN ADULT: Status: RESOLVED | Noted: 2024-01-22 | Resolved: 2025-02-07

## 2025-02-07 PROBLEM — E66.813 CLASS 3 SEVERE OBESITY DUE TO EXCESS CALORIES WITH SERIOUS COMORBIDITY AND BODY MASS INDEX (BMI) OF 40.0 TO 44.9 IN ADULT: Status: RESOLVED | Noted: 2024-01-22 | Resolved: 2025-02-07

## 2025-02-07 PROCEDURE — 1160F RVW MEDS BY RX/DR IN RCRD: CPT | Mod: CPTII,S$GLB,, | Performed by: FAMILY MEDICINE

## 2025-02-07 PROCEDURE — 3078F DIAST BP <80 MM HG: CPT | Mod: CPTII,S$GLB,, | Performed by: FAMILY MEDICINE

## 2025-02-07 PROCEDURE — 99999 PR PBB SHADOW E&M-EST. PATIENT-LVL V: CPT | Mod: PBBFAC,,, | Performed by: FAMILY MEDICINE

## 2025-02-07 PROCEDURE — 3008F BODY MASS INDEX DOCD: CPT | Mod: CPTII,S$GLB,, | Performed by: FAMILY MEDICINE

## 2025-02-07 PROCEDURE — 1159F MED LIST DOCD IN RCRD: CPT | Mod: CPTII,S$GLB,, | Performed by: FAMILY MEDICINE

## 2025-02-07 PROCEDURE — 77063 BREAST TOMOSYNTHESIS BI: CPT | Mod: 26,,, | Performed by: RADIOLOGY

## 2025-02-07 PROCEDURE — 3075F SYST BP GE 130 - 139MM HG: CPT | Mod: CPTII,S$GLB,, | Performed by: FAMILY MEDICINE

## 2025-02-07 PROCEDURE — 91320 SARSCV2 VAC 30MCG TRS-SUC IM: CPT | Mod: S$GLB,,, | Performed by: FAMILY MEDICINE

## 2025-02-07 PROCEDURE — 77067 SCR MAMMO BI INCL CAD: CPT | Mod: 26,,, | Performed by: RADIOLOGY

## 2025-02-07 PROCEDURE — 90656 IIV3 VACC NO PRSV 0.5 ML IM: CPT | Mod: S$GLB,,, | Performed by: FAMILY MEDICINE

## 2025-02-07 PROCEDURE — 3044F HG A1C LEVEL LT 7.0%: CPT | Mod: CPTII,S$GLB,, | Performed by: FAMILY MEDICINE

## 2025-02-07 PROCEDURE — 90471 IMMUNIZATION ADMIN: CPT | Mod: S$GLB,,, | Performed by: FAMILY MEDICINE

## 2025-02-07 PROCEDURE — 77067 SCR MAMMO BI INCL CAD: CPT | Mod: TC,PO

## 2025-02-07 PROCEDURE — G2211 COMPLEX E/M VISIT ADD ON: HCPCS | Mod: S$GLB,,, | Performed by: FAMILY MEDICINE

## 2025-02-07 PROCEDURE — 90480 ADMN SARSCOV2 VAC 1/ONLY CMP: CPT | Mod: S$GLB,,, | Performed by: FAMILY MEDICINE

## 2025-02-07 PROCEDURE — 99214 OFFICE O/P EST MOD 30 MIN: CPT | Mod: 25,S$GLB,, | Performed by: FAMILY MEDICINE

## 2025-02-07 RX ORDER — OMEPRAZOLE 10 MG/1
10 CAPSULE, DELAYED RELEASE ORAL DAILY
Qty: 90 CAPSULE | Refills: 3 | Status: CANCELLED | OUTPATIENT
Start: 2025-02-07 | End: 2026-02-07

## 2025-02-07 RX ORDER — PRAVASTATIN SODIUM 20 MG/1
20 TABLET ORAL DAILY
Qty: 90 TABLET | Refills: 3 | Status: CANCELLED | OUTPATIENT
Start: 2025-02-07 | End: 2026-02-07

## 2025-02-07 RX ORDER — OMEPRAZOLE 10 MG/1
10 CAPSULE, DELAYED RELEASE ORAL DAILY
Qty: 90 CAPSULE | Refills: 3 | Status: SHIPPED | OUTPATIENT
Start: 2025-02-07

## 2025-02-07 RX ORDER — LISINOPRIL 20 MG/1
20 TABLET ORAL DAILY
Qty: 90 TABLET | Refills: 3 | Status: CANCELLED | OUTPATIENT
Start: 2025-02-07 | End: 2026-02-07

## 2025-02-07 RX ORDER — LISINOPRIL 20 MG/1
20 TABLET ORAL DAILY
Qty: 90 TABLET | Refills: 3 | Status: SHIPPED | OUTPATIENT
Start: 2025-02-07 | End: 2026-02-07

## 2025-02-07 NOTE — PROGRESS NOTES
Patient given flu v and covid vaccine via injection. 0 complaints of, tolerated well. Advised to wait in lobby 15 mins for adverse reaction. Verbalized understanding.

## 2025-02-07 NOTE — PROGRESS NOTES
Assessment & Plan:    Essential hypertension  -     CBC Auto Differential; Future; Expected date: 02/07/2025  -     Comprehensive Metabolic Panel; Future; Expected date: 02/07/2025  -     Hemoglobin A1C; Future; Expected date: 02/07/2025  -     Lipid Panel; Future; Expected date: 02/07/2025  -     lisinopriL (PRINIVIL,ZESTRIL) 20 MG tablet; Take 1 tablet (20 mg total) by mouth once daily.  Dispense: 90 tablet; Refill: 3    Hyperlipidemia, unspecified hyperlipidemia type  -     Lipid Panel; Future; Expected date: 02/07/2025    The 10-year ASCVD risk score (Arsh JEFFREY, et al., 2019) is: 7.8%    Values used to calculate the score:      Age: 64 years      Sex: Female      Is Non- : No      Diabetic: No      Tobacco smoker: No      Systolic Blood Pressure: 130 mmHg      Is BP treated: Yes      HDL Cholesterol: 34 mg/dL      Total Cholesterol: 193 mg/dL    Class 1 obesity due to excess calories with serious comorbidity and body mass index (BMI) of 31.0 to 31.9 in adult  -     Hemoglobin A1C; Future; Expected date: 02/07/2025  -     Lipid Panel; Future; Expected date: 02/07/2025    Congratulated patient on her weight loss.  May continue lisinopril at a lower dose of 20 mg.  May consider eventually discontinuing this medication if her blood pressure continues to improve with weight loss.  Advised patient to resume her statin as primary prevention for heart disease, which she declines.  Repeat labs in 6 months.    Gastroesophageal reflux disease, unspecified whether esophagitis present  -     omeprazole (PRILOSEC) 10 MG capsule; Take 1 capsule (10 mg total) by mouth once daily.  Dispense: 90 capsule; Refill: 3    Refilled omeprazole at a lower dose of 10 mg.  May eventually discontinue medication when she is ready.    Screening for colon cancer  -     Ambulatory referral/consult to Endo Procedure ; Future; Expected date: 02/08/2025    COVID-19 vaccine administered  -     COVID-19 (Pfizer) 30  mcg/0.3 mL IM vaccine (>/= 11 yo) 0.3 mL    Influenza vaccine needed  -     influenza (Flulaval, Fluzone, Fluarix) 45 mcg/0.5 mL IM vaccine (> or = 6 mo) 0.5 mL      Health maintenance reviewed & addressed above.  -mammogram performed earlier today, result pending    Recommended RSV vaccine.    Follow-up: Follow up in about 6 months (around 8/7/2025).  ______________________________________________________________________    Chief Complaint  Chief Complaint   Patient presents with    Health Maintenance       HPI  Violetta Coe is a 64 y.o. female with medical diagnoses as listed in the medical history and problem list that presents to the office to follow up on her chronic conditions.  Recently recovered from a urinary tract infection.  Patient has lost about 40 lb since her last visit.  She has weaned down her lisinopril to 20 mg with good blood pressure control at home.  She is also interested in weaning down her omeprazole due to improved GERD symptoms.  Patient is prescribed pravastatin for hyperlipidemia, which she has not taken in several months, preferring to lower her cholesterol with diet alone.     Most recent pertinent workup:     Last CBC Results:   Lab Results   Component Value Date    WBC 6.58 01/31/2025    HGB 13.7 01/31/2025    HCT 42.0 01/31/2025     01/31/2025       Last CMP Results  Lab Results   Component Value Date     01/31/2025    K 4.4 01/31/2025     01/31/2025    CO2 22 (L) 01/31/2025    BUN 17 01/31/2025    CREATININE 0.6 01/31/2025    CALCIUM 9.6 01/31/2025    ALBUMIN 3.6 01/31/2025    AST 18 01/31/2025    ALT 17 01/31/2025       Last Lipids  Lab Results   Component Value Date    CHOL 193 01/31/2025    TRIG 160 (H) 01/31/2025    HDL 34 (L) 01/31/2025    LDLCALC 127.0 01/31/2025   The 10-year ASCVD risk score (Arsh JEFFREY, et al., 2019) is: 7.8%    Values used to calculate the score:      Age: 64 years      Sex: Female      Is Non- : No      Diabetic:  No      Tobacco smoker: No      Systolic Blood Pressure: 130 mmHg      Is BP treated: Yes      HDL Cholesterol: 34 mg/dL      Total Cholesterol: 193 mg/dL      Last A1C  Lab Results   Component Value Date    HGBA1C 5.2 01/31/2025         Health Maintenance         Date Due Completion Date    Pneumococcal Vaccines (Age 50+) (1 of 1 - PCV) Never done ---    RSV Vaccine (Age 60+ and Pregnant patients) (1 - Risk 60-74 years 1-dose series) Never done ---    Colorectal Cancer Screening 01/27/2025 1/27/2020    Mammogram 02/02/2025 2/2/2024    Hemoglobin A1c (Diabetic Prevention Screening) 01/31/2028 1/31/2025    Lipid Panel 01/31/2030 1/31/2025    TETANUS VACCINE 08/18/2030 8/18/2020              PAST MEDICAL HISTORY:  Past Medical History:   Diagnosis Date    Genetic testing 04/02/2024    [place] BRCA1/2 Analyses with CancerNext-Expanded +RNAinsight (77 genes) - NEGATIVE    GERD (gastroesophageal reflux disease)     Hx of colonic polyp 01/27/2020    Hyperlipidemia     Hypertension     Obesity, unspecified        PAST SURGICAL HISTORY:  Past Surgical History:   Procedure Laterality Date    BREAST CYST ASPIRATION      BREAST LUMPECTOMY Right     approx age 26yrs    CATARACT EXTRACTION      CHOLECYSTECTOMY      COLONOSCOPY W/ POLYPECTOMY  01/27/2020    GALLBLADDER SURGERY      HYSTERECTOMY  12/01/2013    Endometrial Cancer       SOCIAL HISTORY:  Social History     Socioeconomic History    Marital status: Unknown   Tobacco Use    Smoking status: Never     Passive exposure: Never    Smokeless tobacco: Never   Substance and Sexual Activity    Alcohol use: Never    Drug use: Never     Social Drivers of Health     Financial Resource Strain: Low Risk  (12/13/2023)    Overall Financial Resource Strain (CARDIA)     Difficulty of Paying Living Expenses: Not hard at all   Food Insecurity: No Food Insecurity (12/13/2023)    Hunger Vital Sign     Worried About Running Out of Food in the Last Year: Never true     Ran Out of Food in the  Last Year: Never true   Transportation Needs: No Transportation Needs (12/13/2023)    PRAPARE - Transportation     Lack of Transportation (Medical): No     Lack of Transportation (Non-Medical): No   Physical Activity: Insufficiently Active (12/13/2023)    Exercise Vital Sign     Days of Exercise per Week: 5 days     Minutes of Exercise per Session: 20 min   Stress: No Stress Concern Present (12/13/2023)    [employer] - Occupational Stress Questionnaire     Feeling of Stress : Only a little   Housing Stability: Low Risk  (12/13/2023)    Housing Stability Vital Sign     Unable to Pay for Housing in the Last Year: No     Number of Places Lived in the Last Year: 2     Unstable Housing in the Last Year: No       FAMILY HISTORY:  Family History   Problem Relation Name Age of Onset    Hypertension Mother Radha     Diabetes Mother Radha     Stroke Mother Radha     Breast cancer Mother Radha 46        UL mastectomy    Hypertension Father Lee     Hypothyroidism Father Lee     Prostate cancer Father Lee 66    Pancreatic cancer Maternal Aunt Felicita 80 - 89    Breast cancer Maternal Aunt Felicita 70 - 79    Breast cancer Maternal Aunt Esvinen 80        UL mastectomy    Hypertension Brother Lee Jr     Anuerysm Maternal Grandfather      Heart disease Maternal Grandmother      Prostate cancer Paternal Grandfather  78        metastatic to brain    Leukemia Paternal Grandmother  70 - 79    Leukemia Maternal Uncle Prieto 80 - 89    Leukemia Maternal Uncle Tino 80 - 89    Cancer Maternal Cousin          type ?    Leukemia Paternal Uncle         ALLERGIES AND MEDICATIONS: updated and reviewed.  Review of patient's allergies indicates:  No Known Allergies  Current Outpatient Medications   Medication Sig Dispense Refill    CALCIUM ORAL Take by mouth.      cholecalciferol, vitamin D3, 1,250 mcg (50,000 unit) capsule Take 1 tablet by mouth.      MAGNESIUM OXIDE ORAL Take 1 tablet by mouth once daily.       "multivitamin with minerals tablet Take 1 tablet by mouth once daily.      VITAMIN B COMPLEX ORAL Take 1 tablet by mouth once daily.      lisinopriL (PRINIVIL,ZESTRIL) 20 MG tablet Take 1 tablet (20 mg total) by mouth once daily. 90 tablet 3    omeprazole (PRILOSEC) 10 MG capsule Take 1 capsule (10 mg total) by mouth once daily. 90 capsule 3     No current facility-administered medications for this visit.         ROS  Review of Systems   Constitutional:  Negative for activity change and unexpected weight change.   Genitourinary:  Negative for dysuria, frequency and urgency.           Physical Exam  Vitals:    02/07/25 0812   BP: 130/64   Pulse: 75   Resp: 18   SpO2: 98%   Weight: 77.6 kg (170 lb 15.5 oz)   Height: 5' 2" (1.575 m)    Body mass index is 31.27 kg/m².  Weight: 77.6 kg (170 lb 15.5 oz)   Height: 5' 2" (157.5 cm)   Physical Exam  Constitutional:       General: She is not in acute distress.     Appearance: She is obese.   HENT:      Head: Normocephalic and atraumatic.   Neck:      Thyroid: No thyromegaly.      Vascular: No carotid bruit.   Cardiovascular:      Rate and Rhythm: Normal rate and regular rhythm.      Pulses: Normal pulses.      Heart sounds: Normal heart sounds.   Pulmonary:      Effort: Pulmonary effort is normal. No respiratory distress.      Breath sounds: Normal breath sounds.   Musculoskeletal:      Cervical back: Neck supple.      Right lower leg: No edema.      Left lower leg: No edema.   Lymphadenopathy:      Cervical: No cervical adenopathy.   Skin:     General: Skin is warm and dry.      Findings: No rash.   Neurological:      General: No focal deficit present.      Mental Status: She is alert and oriented to person, place, and time.   Psychiatric:         Mood and Affect: Mood normal.         Behavior: Behavior normal.         Thought Content: Thought content normal.             "

## 2025-02-10 ENCOUNTER — PATIENT MESSAGE (OUTPATIENT)
Dept: FAMILY MEDICINE | Facility: CLINIC | Age: 65
End: 2025-02-10
Payer: COMMERCIAL

## 2025-02-10 DIAGNOSIS — Z91.89 AT RISK FOR BREAST CANCER: Primary | ICD-10-CM

## 2025-02-19 ENCOUNTER — PATIENT MESSAGE (OUTPATIENT)
Dept: ENDOSCOPY | Facility: HOSPITAL | Age: 65
End: 2025-02-19

## 2025-02-19 ENCOUNTER — CLINICAL SUPPORT (OUTPATIENT)
Dept: ENDOSCOPY | Facility: HOSPITAL | Age: 65
End: 2025-02-19
Attending: FAMILY MEDICINE
Payer: COMMERCIAL

## 2025-02-19 VITALS — BODY MASS INDEX: 31.28 KG/M2 | WEIGHT: 170 LBS | HEIGHT: 62 IN

## 2025-02-19 DIAGNOSIS — Z12.11 ENCOUNTER FOR SCREENING COLONOSCOPY: Primary | ICD-10-CM

## 2025-02-19 DIAGNOSIS — Z12.11 SCREENING FOR COLON CANCER: ICD-10-CM

## 2025-02-19 NOTE — PLAN OF CARE
Referral for procedure from PAT appointment      Spoke to patient to schedule procedure(s) Colonoscopy       Physician to perform procedure(s) Dr. ROBERTO Topete  Date of Procedure (s) 04/03/2025  Arrival Time 11:30 Am   Time of Procedure(s) 12:30 PM    Location of Procedure(s) [facility]   Type of Rx Prep sent to patient: PEG  Instructions provided to patient via [facility]    Patient was informed on the following information and verbalized understanding. Screening questionnaire reviewed with patient and complete. If procedure requires anesthesia, a responsible adult needs to be present to accompany the patient home, patient cannot drive after receiving anesthesia. Appointment details are tentative, especially check-in time. Patient will receive a prep-op call 7 days prior to confirm check-in time for procedure. If applicable the patient should contact their pharmacy to verify Rx for procedure prep is ready for pick-up. Patient was advised to call the scheduling department at [phone] if pharmacy states no Rx is available. Patient was advised to call the endoscopy scheduling department if any questions or concerns arise.      [facility] Scheduling Department

## 2025-03-21 ENCOUNTER — TELEPHONE (OUTPATIENT)
Dept: FAMILY MEDICINE | Facility: CLINIC | Age: 65
End: 2025-03-21
Payer: COMMERCIAL

## 2025-03-21 DIAGNOSIS — Z71.85 VACCINE COUNSELING: Primary | ICD-10-CM

## 2025-03-21 NOTE — TELEPHONE ENCOUNTER
Patient's wife had a Telehealth visit today. While on speaker phone, patient inquired about immunity to measles. Open to getting vaccine titers. Orders signed.

## 2025-04-02 ENCOUNTER — ANESTHESIA EVENT (OUTPATIENT)
Dept: ENDOSCOPY | Facility: HOSPITAL | Age: 65
End: 2025-04-02
Payer: COMMERCIAL

## 2025-04-02 RX ORDER — SODIUM CHLORIDE 9 MG/ML
INJECTION, SOLUTION INTRAVENOUS CONTINUOUS
OUTPATIENT
Start: 2025-04-02

## 2025-04-02 RX ORDER — LIDOCAINE HYDROCHLORIDE 10 MG/ML
1 INJECTION, SOLUTION EPIDURAL; INFILTRATION; INTRACAUDAL; PERINEURAL ONCE
OUTPATIENT
Start: 2025-04-02 | End: 2025-04-02

## 2025-04-03 ENCOUNTER — ANESTHESIA (OUTPATIENT)
Dept: ENDOSCOPY | Facility: HOSPITAL | Age: 65
End: 2025-04-03
Payer: COMMERCIAL

## 2025-04-03 ENCOUNTER — HOSPITAL ENCOUNTER (OUTPATIENT)
Facility: HOSPITAL | Age: 65
Discharge: HOME | End: 2025-04-03
Attending: STUDENT IN AN ORGANIZED HEALTH CARE EDUCATION/TRAINING PROGRAM | Admitting: STUDENT IN AN ORGANIZED HEALTH CARE EDUCATION/TRAINING PROGRAM
Payer: COMMERCIAL

## 2025-04-03 DIAGNOSIS — Z86.0100 HISTORY OF COLON POLYPS: Primary | ICD-10-CM

## 2025-04-03 PROCEDURE — 37000008 HC ANESTHESIA 1ST 15 MINUTES: Performed by: STUDENT IN AN ORGANIZED HEALTH CARE EDUCATION/TRAINING PROGRAM

## 2025-04-03 PROCEDURE — 37000009 HC ANESTHESIA EA ADD 15 MINS: Performed by: STUDENT IN AN ORGANIZED HEALTH CARE EDUCATION/TRAINING PROGRAM

## 2025-04-03 PROCEDURE — G0105 COLORECTAL SCRN; HI RISK IND: HCPCS | Mod: ,,, | Performed by: STUDENT IN AN ORGANIZED HEALTH CARE EDUCATION/TRAINING PROGRAM

## 2025-04-03 PROCEDURE — 25000003 PHARM REV CODE 250: Performed by: STUDENT IN AN ORGANIZED HEALTH CARE EDUCATION/TRAINING PROGRAM

## 2025-04-03 PROCEDURE — G0105 COLORECTAL SCRN; HI RISK IND: HCPCS | Performed by: STUDENT IN AN ORGANIZED HEALTH CARE EDUCATION/TRAINING PROGRAM

## 2025-04-03 PROCEDURE — 63600175 PHARM REV CODE 636 W HCPCS: Performed by: STUDENT IN AN ORGANIZED HEALTH CARE EDUCATION/TRAINING PROGRAM

## 2025-04-03 RX ORDER — PROPOFOL 10 MG/ML
VIAL (ML) INTRAVENOUS
Status: DISCONTINUED
Start: 2025-04-03 | End: 2025-04-03 | Stop reason: HOSPADM

## 2025-04-03 RX ORDER — LIDOCAINE HYDROCHLORIDE 20 MG/ML
INJECTION INTRAVENOUS
Status: DISCONTINUED | OUTPATIENT
Start: 2025-04-03 | End: 2025-04-03

## 2025-04-03 RX ORDER — LIDOCAINE HYDROCHLORIDE 20 MG/ML
INJECTION, SOLUTION EPIDURAL; INFILTRATION; INTRACAUDAL; PERINEURAL
Status: DISCONTINUED
Start: 2025-04-03 | End: 2025-04-03 | Stop reason: HOSPADM

## 2025-04-03 RX ORDER — PROPOFOL 10 MG/ML
VIAL (ML) INTRAVENOUS
Status: DISCONTINUED | OUTPATIENT
Start: 2025-04-03 | End: 2025-04-03

## 2025-04-03 RX ADMIN — LIDOCAINE HYDROCHLORIDE 100 MG: 20 INJECTION, SOLUTION INTRAVENOUS at 12:04

## 2025-04-03 RX ADMIN — PROPOFOL 30 MG: 10 INJECTION, EMULSION INTRAVENOUS at 12:04

## 2025-04-03 RX ADMIN — PROPOFOL 50 MG: 10 INJECTION, EMULSION INTRAVENOUS at 12:04

## 2025-04-03 RX ADMIN — PROPOFOL 70 MG: 10 INJECTION, EMULSION INTRAVENOUS at 12:04

## 2025-04-03 RX ADMIN — SODIUM CHLORIDE: 0.9 INJECTION, SOLUTION INTRAVENOUS at 12:04

## 2025-04-03 NOTE — PLAN OF CARE
Procedure and recovery complete. Pt awake and alert. MD Topete at bedside, procedure findings and suggestions discussed. Discharge instructions given, pt verbalized understanding of instruction. Iv was D/c'd, inner cannula intact. No distress noted. No c/o pain. Gait steady, able to ambulate without assistance. Pt walked out accompanied by significant other.

## 2025-04-03 NOTE — ANESTHESIA PREPROCEDURE EVALUATION
[facility]  Anesthesia Pre-Operative Evaluation         Patient Name: Violetta Coe  YOB: 1960  MRN: [id number]    SUBJECTIVE:     Pre-operative evaluation for Procedure(s) (LRB):  COLONOSCOPY, SCREENING, LOW RISK PATIENT (N/A)     04/03/2025    Violetta Coe is a 64 y.o. female w/ a significant PMHx of HTN, HLD, GERD, HCC, madibular lymphadenectomy (stable per ENT in feb)  Patient now presents for the above procedure(s).    TTE:   none documented.     Problem List[1]    Review of patient's allergies indicates:  No Known Allergies    Current Inpatient Medications:      Medications Ordered Prior to Encounter[2]    Past Surgical History:   Procedure Laterality Date    BREAST CYST ASPIRATION      BREAST LUMPECTOMY Right     approx age 26yrs    CATARACT EXTRACTION      CHOLECYSTECTOMY      COLONOSCOPY W/ POLYPECTOMY  01/27/2020    GALLBLADDER SURGERY      HYSTERECTOMY  12/01/2013    Endometrial Cancer       OBJECTIVE:     Vital Signs Range (Last 24H):         Significant Labs:  Lab Results   Component Value Date    WBC 6.58 01/31/2025    HGB 13.7 01/31/2025    HCT 42.0 01/31/2025     01/31/2025    CHOL 193 01/31/2025    TRIG 160 (H) 01/31/2025    HDL 34 (L) 01/31/2025    ALT 17 01/31/2025    AST 18 01/31/2025     01/31/2025    K 4.4 01/31/2025     01/31/2025    CREATININE 0.6 01/31/2025    BUN 17 01/31/2025    CO2 22 (L) 01/31/2025    TSH 1.11 01/09/2023    INR 1.0 12/02/2024    HGBA1C 5.2 01/31/2025       Diagnostic Studies: No relevant studies.    EKG:   No results found for this or any previous visit.    ASSESSMENT/PLAN:           Pre-op Assessment    I have reviewed the Patient Summary Reports.     I have reviewed the Nursing Notes. I have reviewed the NPO Status.   I have reviewed the Medications.     Review of Systems  Anesthesia Hx:  No previous Anesthesia             Denies Family Hx of Anesthesia complications.    Denies Personal Hx of Anesthesia complications.                     Hematology/Oncology:       -- Anemia:                  Current/Recent Cancer.                EENT/Dental:  EENT/Dental Normal           Cardiovascular:     Hypertension           hyperlipidemia                               Pulmonary:  Pulmonary Normal                       Renal/:  Renal/ Normal                 Hepatic/GI:     GERD Liver Disease,               Neurological:  Neurology Normal                                      Endocrine:  Endocrine Normal            Psych:  Psychiatric Normal                    Physical Exam  General: Cooperative, Alert and Oriented    Airway:  Mallampati: III   Mouth Opening: Normal  TM Distance: Normal  Tongue: Normal  Neck ROM: Normal ROM    Dental:  Intact        Anesthesia Plan  Type of Anesthesia, risks & benefits discussed:    Anesthesia Type: Gen Natural Airway  Intra-op Monitoring Plan: Standard ASA Monitors  Post Op Pain Control Plan: multimodal analgesia  Induction:  IV  Informed Consent: Informed consent signed with the Patient and all parties understand the risks and agree with anesthesia plan.  All questions answered.   ASA Score: 2  Day of Surgery Review of History & Physical: H&P Update referred to the surgeon/provider.    Ready For Surgery From Anesthesia Perspective.     .           [1]   Patient Active Problem List  Diagnosis   (none) - all problems resolved or deleted   [2]   No current facility-administered medications on file prior to encounter.     Current Outpatient Medications on File Prior to Encounter   Medication Sig Dispense Refill    lisinopriL (PRINIVIL,ZESTRIL) 20 MG tablet Take 1 tablet (20 mg total) by mouth once daily. 90 tablet 3    CALCIUM ORAL Take by mouth.      cholecalciferol, vitamin D3, 1,250 mcg (50,000 unit) capsule Take 1 tablet by mouth.      MAGNESIUM OXIDE ORAL Take 1 tablet by mouth once daily.      multivitamin with minerals tablet Take 1 tablet by mouth once daily.      omeprazole (PRILOSEC) 10 MG capsule Take  1 capsule (10 mg total) by mouth once daily. 90 capsule 3    VITAMIN B COMPLEX ORAL Take 1 tablet by mouth once daily.

## 2025-04-03 NOTE — TRANSFER OF CARE
Anesthesia Transfer of Care Note    Patient: Violetta Coe    Procedure(s) Performed: Procedure(s) (LRB):  COLONOSCOPY, SCREENING, LOW RISK PATIENT (N/A)    Patient location: PACU    Anesthesia Type: general    Transport from OR: Transported from OR on 6-10 L/min O2 by face mask with adequate spontaneous ventilation    Post pain: adequate analgesia    Post assessment: no apparent anesthetic complications and tolerated procedure well    Post vital signs: stable    Level of consciousness: sedated    Nausea/Vomiting: no nausea/vomiting    Complications: none    Transfer of care protocol was followed      Last vitals: Visit Vitals  /75 (BP Location: Left arm, Patient Position: Lying)   Pulse 83   Temp 36.6 °C (97.9 °F) (Oral)   Resp 14   SpO2 98%   Breastfeeding No

## 2025-04-03 NOTE — H&P
Short Stay Endoscopy History and Physical    PCP - Berta Evans, DO    Procedure - Colonoscopy  ASA - per anesthesia  Mallampati - per anesthesia  Plan of anesthesia - MAC    HPI:  This is a 64 y.o. female here for evaluation of : personal history of colon polyps    ROS:  Constitutional: No fevers, chills  CV: No chest pain  Pulm: No cough  Ophtho: No vision changes  GI: see HPI  Derm: No rash    Medical History:  has a past medical history of Genetic testing (04/02/2024), GERD (gastroesophageal reflux disease), colonic polyp (01/27/2020), Hyperlipidemia, Hypertension, and Obesity, unspecified.    Surgical History:  has a past surgical history that includes Hysterectomy (12/01/2013); Breast lumpectomy (Right); Cataract extraction; Gallbladder surgery; Cholecystectomy; Colonoscopy w/ polypectomy (01/27/2020); and Breast cyst aspiration.    Family History: family history includes Anuerysm in her maternal grandfather; Breast cancer (age of onset: 46) in her mother; Breast cancer (age of onset: 70 - 79) in her maternal aunt; Breast cancer (age of onset: 80) in her maternal aunt; Cancer in her maternal cousin; Diabetes in her mother; Heart disease in her maternal grandmother; Hypertension in her brother, father, and mother; Hypothyroidism in her father; Leukemia in her paternal uncle; Leukemia (age of onset: 70 - 79) in her paternal grandmother; Leukemia (age of onset: 80 - 89) in her maternal uncle and maternal uncle; Pancreatic cancer (age of onset: 80 - 89) in her maternal aunt; Prostate cancer (age of onset: 66) in her father; Prostate cancer (age of onset: 78) in her paternal grandfather; Stroke in her mother.. Otherwise no colon cancer, inflammatory bowel disease, or GI malignancies.    Social History:  reports that she has never smoked. She has never been exposed to tobacco smoke. She has never used smokeless tobacco. She reports that she does not drink alcohol and does not use drugs.    Review of  patient's allergies indicates:  No Known Allergies    Medications:   Prescriptions Prior to Admission[1]      Vital Signs:   Vitals:    04/03/25 1142   BP: (!) 149/75   Pulse: 61   Resp: 19   Temp: 97.9 °F (36.6 °C)       General Appearance: Well appearing in no acute distress  Eyes:    No scleral icterus  ENT: atraumatic  Abdomen: Soft, nondistended  Extremities: no tenderness  Skin: normal color    Labs:  Lab Results   Component Value Date    WBC 6.58 01/31/2025    HGB 13.7 01/31/2025    HCT 42.0 01/31/2025     01/31/2025    CHOL 193 01/31/2025    TRIG 160 (H) 01/31/2025    HDL 34 (L) 01/31/2025    ALT 17 01/31/2025    AST 18 01/31/2025     01/31/2025    K 4.4 01/31/2025     01/31/2025    CREATININE 0.6 01/31/2025    BUN 17 01/31/2025    CO2 22 (L) 01/31/2025    TSH 1.11 01/09/2023    INR 1.0 12/02/2024    HGBA1C 5.2 01/31/2025       I have explained the risks and benefits of endoscopy procedures to the patient/their POA including but not limited to bleeding, perforation, infection, and death.  The patient/their POA was asked if they understand and allowed to ask any further questions to their satisfaction.    Mechelle Topete MD         [1]   Medications Prior to Admission   Medication Sig Dispense Refill Last Dose/Taking    lisinopriL (PRINIVIL,ZESTRIL) 20 MG tablet Take 1 tablet (20 mg total) by mouth once daily. 90 tablet 3 4/3/2025    CALCIUM ORAL Take by mouth.       cholecalciferol, vitamin D3, 1,250 mcg (50,000 unit) capsule Take 1 tablet by mouth.       MAGNESIUM OXIDE ORAL Take 1 tablet by mouth once daily.       multivitamin with minerals tablet Take 1 tablet by mouth once daily.       omeprazole (PRILOSEC) 10 MG capsule Take 1 capsule (10 mg total) by mouth once daily. 90 capsule 3     VITAMIN B COMPLEX ORAL Take 1 tablet by mouth once daily.

## 2025-04-03 NOTE — PROVATION PATIENT INSTRUCTIONS
Discharge Summary/Instructions after an Endoscopic Procedure  Patient Name: Violetta Coe  Patient MRN: [id number]  Patient YOB: 1960  Thursday, April 3, 2025  Mechelle Topete MD  Dear patient,  As a result of recent federal legislation (The Federal Cures Act), you may   receive lab or pathology results from your procedure in your [facility]   account before your physician is able to contact you. Your physician or   their representative will relay the results to you with their   recommendations at their soonest availability.  Thank you,  RESTRICTIONS:  During your procedure today, you received medications for sedation.  These   medications may affect your judgment, balance and coordination.  Therefore,   for 24 hours, you have the following restrictions:   - DO NOT drive a car, operate machinery, make legal/financial decisions,   sign important papers or drink alcohol.    ACTIVITY:  Today: no heavy lifting, straining or running due to procedural   sedation/anesthesia.  The following day: return to full activity including work.  DIET:  Eat and drink normally unless instructed otherwise.     TREATMENT FOR COMMON SIDE EFFECTS:  - Mild abdominal pain, nausea, belching, bloating or excessive gas:  rest,   eat lightly and use a heating pad.  - Sore Throat: treat with throat lozenges and/or gargle with warm salt   water.  - Because air was used during the procedure, expelling large amounts of air   from your rectum or belching is normal.  - If a bowel prep was taken, you may not have a bowel movement for 1-3 days.    This is normal.  SYMPTOMS TO WATCH FOR AND REPORT TO YOUR PHYSICIAN:  1. Abdominal pain or bloating, other than gas cramps.  2. Chest pain.  3. Back pain.  4. Signs of infection such as: chills or fever occurring within 24 hours   after the procedure.  5. Rectal bleeding, which would show as bright red, maroon, or black stools.   (A tablespoon of blood from the rectum is not serious, especially if    hemorrhoids are present.)  6. Vomiting.  7. Weakness or dizziness.  GO DIRECTLY TO THE NEAREST EMERGENCY ROOM IF YOU HAVE ANY OF THE FOLLOWING:      Difficulty breathing              Chills and/or fever over 101 F   Persistent vomiting and/or vomiting blood   Severe abdominal pain   Severe chest pain   Black, tarry stools   Bleeding- more than one tablespoon   Any other symptom or condition that you feel may need urgent attention  Your doctor recommends these additional instructions:  If any biopsies were taken, your doctors clinic will contact you in 1 to 2   weeks with any results.  - Patient has a contact number available for emergencies.  The signs and   symptoms of potential delayed complications were discussed with the   patient.  Return to normal activities tomorrow.  Written discharge   instructions were provided to the patient.   - Discharge patient to home.   - Resume previous diet.   - Continue present medications.   - Repeat colonoscopy in 10 years for screening purposes.  For questions, problems or results please call your physician - Mechelle Topete MD at Work:  [phone].  [facility] can be reached at [phone]     IF A COMPLICATION OR EMERGENCY SITUATION ARISES AND YOU ARE UNABLE TO REACH   YOUR PHYSICIAN - GO DIRECTLY TO THE EMERGENCY ROOM.  MD Mechelle Kumar MD  4/3/2025 12:40:19 PM  This report has been verified and signed electronically.  Dear patient,  As a result of recent federal legislation (The Federal Cures Act), you may   receive lab or pathology results from your procedure in your [facility]   account before your physician is able to contact you. Your physician or   their representative will relay the results to you with their   recommendations at their soonest availability.  Thank you,  PROVATION

## 2025-04-03 NOTE — ANESTHESIA POSTPROCEDURE EVALUATION
Anesthesia Post Evaluation    Patient: Violetta Coe    Procedure(s) Performed: Procedure(s) (LRB):  COLONOSCOPY, SCREENING, LOW RISK PATIENT (N/A)    Final Anesthesia Type: general      Patient location during evaluation: GI PACU  Patient participation: Yes- Able to Participate  Level of consciousness: awake and alert, oriented and awake  Post-procedure vital signs: reviewed and stable  Pain management: adequate  Airway patency: patent  [place] mitigation strategies: Multimodal analgesia  PONV status at discharge: No PONV  Anesthetic complications: no      Cardiovascular status: blood pressure returned to baseline and hemodynamically stable  Respiratory status: unassisted and spontaneous ventilation  Hydration status: euvolemic  Follow-up not needed.              Vitals Value Taken Time   /68 04/03/25 13:00   Temp 36.6 °C (97.9 °F) 04/03/25 12:42   Pulse 69 04/03/25 13:00   Resp 20 04/03/25 13:00   SpO2 100 % 04/03/25 13:00         No case tracking events are documented in the log.      Pain/Patsy Score: Patsy Score: 10 (4/3/2025  1:00 PM)

## 2025-04-11 ENCOUNTER — LAB VISIT (OUTPATIENT)
Dept: LAB | Facility: HOSPITAL | Age: 65
End: 2025-04-11
Attending: FAMILY MEDICINE
Payer: COMMERCIAL

## 2025-04-11 VITALS
HEART RATE: 59 BPM | RESPIRATION RATE: 20 BRPM | SYSTOLIC BLOOD PRESSURE: 124 MMHG | TEMPERATURE: 98 F | OXYGEN SATURATION: 97 % | DIASTOLIC BLOOD PRESSURE: 71 MMHG

## 2025-04-11 DIAGNOSIS — Z71.85 VACCINE COUNSELING: ICD-10-CM

## 2025-04-11 PROCEDURE — 36415 COLL VENOUS BLD VENIPUNCTURE: CPT | Mod: PO

## 2025-04-11 PROCEDURE — 86762 RUBELLA ANTIBODY: CPT

## 2025-04-11 PROCEDURE — 86706 HEP B SURFACE ANTIBODY: CPT

## 2025-04-11 PROCEDURE — 86765 RUBEOLA ANTIBODY: CPT

## 2025-04-11 PROCEDURE — 86735 MUMPS ANTIBODY: CPT

## 2025-04-11 PROCEDURE — 86787 VARICELLA-ZOSTER ANTIBODY: CPT

## 2025-04-14 ENCOUNTER — PATIENT MESSAGE (OUTPATIENT)
Dept: FAMILY MEDICINE | Facility: CLINIC | Age: 65
End: 2025-04-14
Payer: COMMERCIAL

## 2025-04-14 DIAGNOSIS — Z23 NEED FOR HEPATITIS B VACCINATION: Primary | ICD-10-CM

## 2025-04-14 LAB
MUMPS IGG (OHS): 71 AU/ML
MUMPS IGG INTERPRETATION (OHS): POSITIVE
RUBEOLA (MEASLES) IGG (OHS): 94.4 AU/ML
RUBEOLA IGG INTERP. (OHS): POSITIVE
RUBV IGG SER-ACNC: 68.2 IU/ML
RUBV IGG SER-IMP: REACTIVE
V.ZOSTER IGG INTERP (OHS): POSITIVE
VARICELLA ZOSTER IGG (OHS): 32.8 S/CO
W HEPATITIS B SURFACE ANTIBODY, QUALITATIVE: NEGATIVE
W HEPATITIS B SURFACE ANTIBODY, QUANTITATIVE: <3 MIU/ML

## 2025-04-14 NOTE — TELEPHONE ENCOUNTER
Please inform patient that her vaccine titers are showing immunity to measles but it looks like she is not immune to hepatitis B. The titers panel automatically checks for this. She needs a catch up series with hepatitis B vaccines x2, each 1 month apart.

## 2025-04-23 ENCOUNTER — TELEPHONE (OUTPATIENT)
Dept: FAMILY MEDICINE | Facility: CLINIC | Age: 65
End: 2025-04-23
Payer: COMMERCIAL

## 2025-04-23 NOTE — TELEPHONE ENCOUNTER
Spoke with patient in regards to appointment scheduled for 08/25/2025  with pcp. Provider is out. Was able to reschedule patient for 09/12/2025 at 8 and Labwork 09/05/2025 at 8 . Patient has no other questions or concerns at this time.

## 2025-05-09 ENCOUNTER — CLINICAL SUPPORT (OUTPATIENT)
Dept: FAMILY MEDICINE | Facility: CLINIC | Age: 65
End: 2025-05-09
Payer: COMMERCIAL

## 2025-05-09 DIAGNOSIS — Z23 NEED FOR PROPHYLACTIC VACCINATION WITH UNSPECIFIED COMBINED VACCINE: ICD-10-CM

## 2025-05-09 DIAGNOSIS — Z23 PNEUMOCOCCAL VACCINATION ADMINISTERED AT CURRENT VISIT: Primary | ICD-10-CM

## 2025-05-09 NOTE — PROGRESS NOTES
Pt tolerated   IM injection without problems or reactions.Vis information given. Advise to wait 15 minute wait time.    Other /